# Patient Record
Sex: FEMALE | Race: WHITE | NOT HISPANIC OR LATINO | Employment: OTHER | URBAN - METROPOLITAN AREA
[De-identification: names, ages, dates, MRNs, and addresses within clinical notes are randomized per-mention and may not be internally consistent; named-entity substitution may affect disease eponyms.]

---

## 2023-08-22 ENCOUNTER — HOSPITAL ENCOUNTER (OUTPATIENT)
Dept: DATA CONVERSION | Facility: HOSPITAL | Age: 73
Discharge: HOME | End: 2023-08-22
Payer: MEDICARE

## 2023-08-22 DIAGNOSIS — R41.3 OTHER AMNESIA: ICD-10-CM

## 2023-08-22 LAB
ALBUMIN SERPL-MCNC: 4.2 GM/DL (ref 3.5–5)
ALBUMIN/GLOB SERPL: 1.4 RATIO (ref 1.5–3)
ALP BLD-CCNC: 70 U/L (ref 35–125)
ALT SERPL-CCNC: 14 U/L (ref 5–40)
AMMONIA PLAS-SCNC: 13 UMOL/L (ref 12–45)
ANION GAP SERPL CALCULATED.3IONS-SCNC: 13 MMOL/L (ref 0–19)
AST SERPL-CCNC: 20 U/L (ref 5–40)
BASOPHILS # BLD AUTO: 0.06 K/UL (ref 0–0.22)
BASOPHILS NFR BLD AUTO: 0.6 % (ref 0–1)
BILIRUB SERPL-MCNC: 0.5 MG/DL (ref 0.1–1.2)
BUN SERPL-MCNC: 21 MG/DL (ref 8–25)
BUN/CREAT SERPL: 19.1 RATIO (ref 8–21)
CALCIUM SERPL-MCNC: 10.2 MG/DL (ref 8.5–10.4)
CHLORIDE SERPL-SCNC: 106 MMOL/L (ref 97–107)
CO2 SERPL-SCNC: 26 MMOL/L (ref 24–31)
CREAT SERPL-MCNC: 1.1 MG/DL (ref 0.4–1.6)
DEPRECATED RDW RBC AUTO: 50.7 FL (ref 37–54)
DIFFERENTIAL METHOD BLD: ABNORMAL
EOSINOPHIL # BLD AUTO: 0.08 K/UL (ref 0–0.45)
EOSINOPHIL NFR BLD: 0.8 % (ref 0–3)
ERYTHROCYTE [DISTWIDTH] IN BLOOD BY AUTOMATED COUNT: 14.7 % (ref 11.7–15)
ERYTHROCYTE [SEDIMENTATION RATE] IN BLOOD BY WESTERGREN METHOD: 18 MM/HR (ref 0–30)
GFR SERPL CREATININE-BSD FRML MDRD: 54 ML/MIN/1.73 M2
GLOBULIN SER-MCNC: 3 G/DL (ref 1.9–3.7)
GLUCOSE SERPL-MCNC: 124 MG/DL (ref 65–99)
HCT VFR BLD AUTO: 40.4 % (ref 36–44)
HGB BLD-MCNC: 13.6 GM/DL (ref 12–15)
IMM GRANULOCYTES # BLD AUTO: 0.04 K/UL (ref 0–0.1)
LYMPHOCYTES # BLD AUTO: 1.29 K/UL (ref 1.2–3.2)
LYMPHOCYTES NFR BLD MANUAL: 13.1 % (ref 20–40)
MCH RBC QN AUTO: 31.3 PG (ref 26–34)
MCHC RBC AUTO-ENTMCNC: 33.7 % (ref 31–37)
MCV RBC AUTO: 93.1 FL (ref 80–100)
MONOCYTES # BLD AUTO: 0.46 K/UL (ref 0–0.8)
MONOCYTES NFR BLD MANUAL: 4.7 % (ref 0–8)
NEUTROPHILS # BLD AUTO: 7.94 K/UL
NEUTROPHILS # BLD AUTO: 7.94 K/UL (ref 1.8–7.7)
NEUTROPHILS.IMMATURE NFR BLD: 0.4 % (ref 0–1)
NEUTS SEG NFR BLD: 80.4 % (ref 50–70)
NRBC BLD-RTO: 0 /100 WBC
PLATELET # BLD AUTO: 251 K/UL (ref 150–450)
PMV BLD AUTO: 9.9 CU (ref 7–12.6)
POTASSIUM SERPL-SCNC: 4.5 MMOL/L (ref 3.4–5.1)
PROT SERPL-MCNC: 7.2 G/DL (ref 5.9–7.9)
RBC # BLD AUTO: 4.34 M/UL (ref 4–4.9)
SODIUM SERPL-SCNC: 145 MMOL/L (ref 133–145)
TSH SERPL DL<=0.05 MIU/L-ACNC: 0.83 MIU/L (ref 0.27–4.2)
VIT B12 SERPL-MCNC: 693 PG/ML (ref 211–946)
WBC # BLD AUTO: 9.9 K/UL (ref 4.5–11)

## 2023-08-25 LAB — RPR SER QL: NONREACTIVE

## 2023-09-27 PROBLEM — M19.90 ARTHRITIS: Status: ACTIVE | Noted: 2023-09-27

## 2023-09-27 PROBLEM — J44.9 CHRONIC OBSTRUCTIVE PULMONARY DISEASE (MULTI): Status: ACTIVE | Noted: 2023-09-27

## 2023-09-27 PROBLEM — R26.81 UNSTEADY GAIT: Status: ACTIVE | Noted: 2023-09-27

## 2023-09-27 PROBLEM — I65.23 BILATERAL CAROTID ARTERY STENOSIS: Status: ACTIVE | Noted: 2023-09-27

## 2023-09-27 PROBLEM — F41.9 ANXIETY: Status: ACTIVE | Noted: 2023-09-27

## 2023-09-27 PROBLEM — R73.01 IMPAIRED FASTING GLUCOSE: Status: ACTIVE | Noted: 2023-09-27

## 2023-09-27 PROBLEM — R32 INCONTINENCE: Status: ACTIVE | Noted: 2023-09-27

## 2023-09-27 PROBLEM — N20.0 KIDNEY STONES: Status: RESOLVED | Noted: 2023-09-27 | Resolved: 2023-09-27

## 2023-09-27 PROBLEM — N20.0 KIDNEY STONES: Status: ACTIVE | Noted: 2023-09-27

## 2023-09-27 PROBLEM — K59.00 CONSTIPATION: Status: RESOLVED | Noted: 2023-09-27 | Resolved: 2023-09-27

## 2023-09-27 PROBLEM — F32.A DEPRESSIVE DISORDER: Status: ACTIVE | Noted: 2023-09-27

## 2023-09-27 PROBLEM — R39.11 URINARY HESITANCY: Status: ACTIVE | Noted: 2023-09-27

## 2023-09-27 PROBLEM — R20.2 TINGLING OF RIGHT UPPER EXTREMITY: Status: ACTIVE | Noted: 2023-09-27

## 2023-09-27 PROBLEM — I35.8 AORTIC VALVE SCLEROSIS: Status: ACTIVE | Noted: 2023-09-27

## 2023-09-27 PROBLEM — R41.3 AMNESIA: Status: RESOLVED | Noted: 2023-09-27 | Resolved: 2023-09-27

## 2023-09-27 PROBLEM — M85.80 OSTEOPENIA, SENILE: Status: ACTIVE | Noted: 2023-09-27

## 2023-09-27 PROBLEM — E78.5 HYPERLIPIDEMIA: Status: ACTIVE | Noted: 2023-09-27

## 2023-09-27 PROBLEM — G45.8 SUBCLAVIAN STEAL SYNDROME OF LEFT SUBCLAVIAN ARTERY: Status: ACTIVE | Noted: 2023-09-27

## 2023-09-27 PROBLEM — H90.3 SENSORINEURAL HEARING LOSS OF BOTH EARS: Status: ACTIVE | Noted: 2023-09-27

## 2023-09-27 PROBLEM — F17.200 SMOKER: Status: ACTIVE | Noted: 2023-09-27

## 2023-09-27 PROBLEM — E55.9 VITAMIN D DEFICIENCY: Status: ACTIVE | Noted: 2023-09-27

## 2023-09-27 PROBLEM — E78.5 DYSLIPIDEMIA: Status: ACTIVE | Noted: 2023-09-27

## 2023-09-28 ENCOUNTER — OFFICE VISIT (OUTPATIENT)
Dept: PRIMARY CARE | Facility: CLINIC | Age: 73
End: 2023-09-28
Payer: MEDICARE

## 2023-09-28 VITALS
HEIGHT: 64 IN | SYSTOLIC BLOOD PRESSURE: 141 MMHG | DIASTOLIC BLOOD PRESSURE: 83 MMHG | WEIGHT: 187 LBS | BODY MASS INDEX: 31.92 KG/M2 | OXYGEN SATURATION: 94 % | HEART RATE: 78 BPM

## 2023-09-28 DIAGNOSIS — E55.9 VITAMIN D DEFICIENCY: ICD-10-CM

## 2023-09-28 DIAGNOSIS — J44.9 CHRONIC OBSTRUCTIVE PULMONARY DISEASE, UNSPECIFIED COPD TYPE (MULTI): ICD-10-CM

## 2023-09-28 DIAGNOSIS — E78.5 HYPERLIPIDEMIA, UNSPECIFIED HYPERLIPIDEMIA TYPE: ICD-10-CM

## 2023-09-28 DIAGNOSIS — F17.219 CIGARETTE NICOTINE DEPENDENCE WITH NICOTINE-INDUCED DISORDER: ICD-10-CM

## 2023-09-28 DIAGNOSIS — R39.11 URINARY HESITANCY: ICD-10-CM

## 2023-09-28 DIAGNOSIS — I67.9 SMALL VESSEL DISEASE, CEREBROVASCULAR: Primary | ICD-10-CM

## 2023-09-28 DIAGNOSIS — R51.9 CHRONIC INTRACTABLE HEADACHE, UNSPECIFIED HEADACHE TYPE: ICD-10-CM

## 2023-09-28 DIAGNOSIS — M19.90 ARTHRITIS: ICD-10-CM

## 2023-09-28 DIAGNOSIS — G44.329 CHRONIC POST-CONCUSSION HEADACHE: ICD-10-CM

## 2023-09-28 DIAGNOSIS — R73.01 IMPAIRED FASTING GLUCOSE: ICD-10-CM

## 2023-09-28 DIAGNOSIS — G89.29 CHRONIC INTRACTABLE HEADACHE, UNSPECIFIED HEADACHE TYPE: ICD-10-CM

## 2023-09-28 DIAGNOSIS — R26.81 UNSTEADY GAIT: ICD-10-CM

## 2023-09-28 DIAGNOSIS — Z12.31 ENCOUNTER FOR SCREENING MAMMOGRAM FOR MALIGNANT NEOPLASM OF BREAST: ICD-10-CM

## 2023-09-28 PROCEDURE — 1159F MED LIST DOCD IN RCRD: CPT | Performed by: INTERNAL MEDICINE

## 2023-09-28 PROCEDURE — 1160F RVW MEDS BY RX/DR IN RCRD: CPT | Performed by: INTERNAL MEDICINE

## 2023-09-28 PROCEDURE — 99204 OFFICE O/P NEW MOD 45 MIN: CPT | Performed by: INTERNAL MEDICINE

## 2023-09-28 PROCEDURE — 1125F AMNT PAIN NOTED PAIN PRSNT: CPT | Performed by: INTERNAL MEDICINE

## 2023-09-28 RX ORDER — TIZANIDINE 2 MG/1
TABLET ORAL
COMMUNITY
End: 2023-10-20 | Stop reason: SINTOL

## 2023-09-28 RX ORDER — SERTRALINE HYDROCHLORIDE 100 MG/1
TABLET, FILM COATED ORAL
COMMUNITY
Start: 2023-04-06

## 2023-09-28 RX ORDER — TIMOLOL MALEATE 5 MG/ML
1 SOLUTION/ DROPS OPHTHALMIC 2 TIMES DAILY
COMMUNITY
Start: 2022-11-18

## 2023-09-28 RX ORDER — IBUPROFEN 200 MG
1 TABLET ORAL EVERY 24 HOURS
Qty: 30 PATCH | Refills: 0 | Status: SHIPPED | OUTPATIENT
Start: 2023-09-28 | End: 2023-10-28

## 2023-09-28 RX ORDER — ASPIRIN 81 MG/1
TABLET ORAL EVERY 24 HOURS
COMMUNITY
Start: 2020-01-16

## 2023-09-28 RX ORDER — TRAZODONE HYDROCHLORIDE 50 MG/1
50 TABLET ORAL NIGHTLY PRN
Qty: 30 TABLET | Refills: 5 | Status: SHIPPED | OUTPATIENT
Start: 2023-09-28 | End: 2024-09-27

## 2023-09-28 RX ORDER — NABUMETONE 500 MG/1
TABLET, FILM COATED ORAL EVERY 12 HOURS
COMMUNITY
End: 2023-10-20 | Stop reason: SINTOL

## 2023-09-28 RX ORDER — ALBUTEROL SULFATE 90 UG/1
2 AEROSOL, METERED RESPIRATORY (INHALATION) EVERY 4 HOURS PRN
COMMUNITY

## 2023-09-28 RX ORDER — FLUTICASONE PROPIONATE AND SALMETEROL 250; 50 UG/1; UG/1
1 POWDER RESPIRATORY (INHALATION)
Qty: 60 EACH | Refills: 11 | Status: SHIPPED | OUTPATIENT
Start: 2023-09-28 | End: 2024-09-27

## 2023-09-28 ASSESSMENT — ENCOUNTER SYMPTOMS
COUGH: 1
HEADACHES: 1
CONSTITUTIONAL NEGATIVE: 1
TREMORS: 1
SLEEP DISTURBANCE: 1
CONFUSION: 1

## 2023-09-28 ASSESSMENT — PATIENT HEALTH QUESTIONNAIRE - PHQ9
2. FEELING DOWN, DEPRESSED OR HOPELESS: NOT AT ALL
1. LITTLE INTEREST OR PLEASURE IN DOING THINGS: NOT AT ALL
SUM OF ALL RESPONSES TO PHQ9 QUESTIONS 1 AND 2: 0

## 2023-09-28 ASSESSMENT — LIFESTYLE VARIABLES
SKIP TO QUESTIONS 9-10: 1
HOW OFTEN DO YOU HAVE SIX OR MORE DRINKS ON ONE OCCASION: NEVER
HOW OFTEN DO YOU HAVE A DRINK CONTAINING ALCOHOL: NEVER
AUDIT-C TOTAL SCORE: 0
HOW MANY STANDARD DRINKS CONTAINING ALCOHOL DO YOU HAVE ON A TYPICAL DAY: PATIENT DOES NOT DRINK

## 2023-09-28 ASSESSMENT — PAIN SCALES - GENERAL: PAINLEVEL: 5

## 2023-09-28 ASSESSMENT — COLUMBIA-SUICIDE SEVERITY RATING SCALE - C-SSRS: 1. IN THE PAST MONTH, HAVE YOU WISHED YOU WERE DEAD OR WISHED YOU COULD GO TO SLEEP AND NOT WAKE UP?: NO

## 2023-09-28 NOTE — PROGRESS NOTES
"Subjective   Patient ID: Hoda Gudino is a 73 y.o. female who presents for New Patient Visit.    HPI   Headache  for . Fell and hit head  in jan with  LOC   but unwitnessed.  Pain over  right temple  and  and forehead.  Wakes her in midddle of  night.    Mr brain no bleed svid  vol  loss 7/24/23.     She does not use  pain  relievers. No releif.    Once in  a while s he  falls a seep without the headache. No fever chills  weakness.  She  sleeps a lot  12  hours a night.   Lob since January.    Hx of  chronic  headaches.   Used  tylenol.  Would only get 1 -2 per month.   June dx  chronic  amnesia  by prev   provider.   Uti  Juarez  dxed      Denies  stress.   .   Sp  shoulder surgery  jan.     Pt has memory issues.  Some difficuty  answering question.     Review of Systems   Constitutional: Negative.    Respiratory:  Positive for cough.    Neurological:  Positive for tremors and headaches.   Psychiatric/Behavioral:  Positive for confusion and sleep disturbance.    All other systems reviewed and are negative.      Objective   /83 (BP Location: Right arm, Patient Position: Sitting, BP Cuff Size: Large adult)   Pulse 78   Ht 1.626 m (5' 4\")   Wt 84.8 kg (187 lb)   SpO2 94%   BMI 32.10 kg/m²     Physical Exam  Vitals and nursing note reviewed.   Constitutional:       Appearance: Normal appearance.   HENT:      Head: Normocephalic and atraumatic.      Nose: Nose normal.   Eyes:      Conjunctiva/sclera: Conjunctivae normal.   Cardiovascular:      Rate and Rhythm: Normal rate and regular rhythm.   Pulmonary:      Effort: Pulmonary effort is normal.   Skin:     General: Skin is dry.   Neurological:      General: No focal deficit present.      Mental Status: She is alert and oriented to person, place, and time. Mental status is at baseline.   Psychiatric:         Mood and Affect: Mood normal.         Behavior: Behavior normal.         Assessment/Plan   np with  undiagnosed problem chronic  uncertain   prognosis. "   Problem List Items Addressed This Visit             ICD-10-CM       Medium    Arthritis M19.90    Chronic obstructive pulmonary disease (CMS/HCC) J44.9    Relevant Medications    fluticasone propion-salmeteroL (Advair Diskus) 250-50 mcg/dose diskus inhaler    Hyperlipidemia E78.5    Impaired fasting glucose R73.01    Unsteady gait R26.81    Urinary hesitancy R39.11    Vitamin D deficiency E55.9     Other Visit Diagnoses         Codes    Small vessel disease, cerebrovascular    -  Primary I67.9    Encounter for screening mammogram for malignant neoplasm of breast     Z12.31    Relevant Orders    BI mammo bilateral screening tomosynthesis    Chronic intractable headache, unspecified headache type     R51.9, G89.29    Relevant Medications    traZODone (Desyrel) 50 mg tablet    Other Relevant Orders    C-Reactive Protein    Chronic post-concussion headache     G44.329    Relevant Medications    traZODone (Desyrel) 50 mg tablet    Cigarette nicotine dependence with nicotine-induced disorder     F17.219    Relevant Medications    nicotine (Nicoderm CQ) 21 mg/24 hr patch

## 2023-10-17 ENCOUNTER — HOSPITAL ENCOUNTER (EMERGENCY)
Facility: HOSPITAL | Age: 73
Discharge: HOME | End: 2023-10-17
Attending: STUDENT IN AN ORGANIZED HEALTH CARE EDUCATION/TRAINING PROGRAM
Payer: MEDICARE

## 2023-10-17 ENCOUNTER — APPOINTMENT (OUTPATIENT)
Dept: RADIOLOGY | Facility: HOSPITAL | Age: 73
End: 2023-10-17
Payer: MEDICARE

## 2023-10-17 VITALS
WEIGHT: 180 LBS | HEIGHT: 66 IN | OXYGEN SATURATION: 93 % | BODY MASS INDEX: 28.93 KG/M2 | HEART RATE: 81 BPM | DIASTOLIC BLOOD PRESSURE: 97 MMHG | TEMPERATURE: 97.7 F | RESPIRATION RATE: 19 BRPM | SYSTOLIC BLOOD PRESSURE: 231 MMHG

## 2023-10-17 DIAGNOSIS — E86.0 DEHYDRATION: ICD-10-CM

## 2023-10-17 DIAGNOSIS — S09.90XA HEAD INJURY, INITIAL ENCOUNTER: ICD-10-CM
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
## 2023-10-17 DIAGNOSIS — B37.9 CANDIDIASIS: ICD-10-CM

## 2023-10-17 DIAGNOSIS — M54.50 LOW BACK PAIN WITHOUT SCIATICA, UNSPECIFIED BACK PAIN LATERALITY, UNSPECIFIED CHRONICITY: ICD-10-CM

## 2023-10-17 LAB
ALBUMIN SERPL-MCNC: 4.3 G/DL (ref 3.5–5)
ALP BLD-CCNC: 93 U/L (ref 35–125)
ALT SERPL-CCNC: 13 U/L (ref 5–40)
ANION GAP SERPL CALC-SCNC: 12 MMOL/L
APPEARANCE UR: CLEAR
AST SERPL-CCNC: 24 U/L (ref 5–40)
BASOPHILS # BLD AUTO: 0.06 X10*3/UL (ref 0–0.1)
BASOPHILS NFR BLD AUTO: 0.5 %
BILIRUB SERPL-MCNC: 0.5 MG/DL (ref 0.1–1.2)
BILIRUB UR STRIP.AUTO-MCNC: NEGATIVE MG/DL
BUN SERPL-MCNC: 25 MG/DL (ref 8–25)
CALCIUM SERPL-MCNC: 10.3 MG/DL (ref 8.5–10.4)
CHLORIDE SERPL-SCNC: 101 MMOL/L (ref 97–107)
CO2 SERPL-SCNC: 27 MMOL/L (ref 24–31)
COLOR UR: YELLOW
CREAT SERPL-MCNC: 1.5 MG/DL (ref 0.4–1.6)
EOSINOPHIL # BLD AUTO: 0.01 X10*3/UL (ref 0–0.4)
EOSINOPHIL NFR BLD AUTO: 0.1 %
ERYTHROCYTE [DISTWIDTH] IN BLOOD BY AUTOMATED COUNT: 13.9 % (ref 11.5–14.5)
GFR SERPL CREATININE-BSD FRML MDRD: 37 ML/MIN/1.73M*2
GLUCOSE SERPL-MCNC: 135 MG/DL (ref 65–99)
GLUCOSE UR STRIP.AUTO-MCNC: NORMAL MG/DL
HCT VFR BLD AUTO: 42.6 % (ref 36–46)
HGB BLD-MCNC: 14.8 G/DL (ref 12–16)
HYALINE CASTS #/AREA URNS AUTO: ABNORMAL /LPF
IMM GRANULOCYTES # BLD AUTO: 0.02 X10*3/UL (ref 0–0.5)
IMM GRANULOCYTES NFR BLD AUTO: 0.2 % (ref 0–0.9)
KETONES UR STRIP.AUTO-MCNC: NEGATIVE MG/DL
LEUKOCYTE ESTERASE UR QL STRIP.AUTO: NEGATIVE
LYMPHOCYTES # BLD AUTO: 0.55 X10*3/UL (ref 0.8–3)
LYMPHOCYTES NFR BLD AUTO: 4.9 %
MCH RBC QN AUTO: 31.4 PG (ref 26–34)
MCHC RBC AUTO-ENTMCNC: 34.7 G/DL (ref 32–36)
MCV RBC AUTO: 90 FL (ref 80–100)
MONOCYTES # BLD AUTO: 0.88 X10*3/UL (ref 0.05–0.8)
MONOCYTES NFR BLD AUTO: 7.9 %
NEUTROPHILS # BLD AUTO: 9.62 X10*3/UL (ref 1.6–5.5)
NEUTROPHILS NFR BLD AUTO: 86.4 %
NITRITE UR QL STRIP.AUTO: NEGATIVE
NRBC BLD-RTO: 0 /100 WBCS (ref 0–0)
PH UR STRIP.AUTO: 6.5 [PH]
PLATELET # BLD AUTO: 304 X10*3/UL (ref 150–450)
PMV BLD AUTO: 9.9 FL (ref 7.5–11.5)
POTASSIUM SERPL-SCNC: 3.3 MMOL/L (ref 3.4–5.1)
PROT SERPL-MCNC: 8.1 G/DL (ref 5.9–7.9)
PROT UR STRIP.AUTO-MCNC: ABNORMAL MG/DL
RBC # BLD AUTO: 4.72 X10*6/UL (ref 4–5.2)
RBC # UR STRIP.AUTO: ABNORMAL /UL
RBC #/AREA URNS AUTO: >20 /HPF
SODIUM SERPL-SCNC: 140 MMOL/L (ref 133–145)
SP GR UR STRIP.AUTO: 1.02
UROBILINOGEN UR STRIP.AUTO-MCNC: NORMAL MG/DL
WBC # BLD AUTO: 11.1 X10*3/UL (ref 4.4–11.3)
WBC #/AREA URNS AUTO: ABNORMAL /HPF

## 2023-10-17 PROCEDURE — 2500000004 HC RX 250 GENERAL PHARMACY W/ HCPCS (ALT 636 FOR OP/ED): Performed by: PHYSICIAN ASSISTANT

## 2023-10-17 PROCEDURE — 71046 X-RAY EXAM CHEST 2 VIEWS: CPT

## 2023-10-17 PROCEDURE — 70450 CT HEAD/BRAIN W/O DYE: CPT | Mod: ME

## 2023-10-17 PROCEDURE — 87086 URINE CULTURE/COLONY COUNT: CPT | Mod: CMCLAB,WESLAB | Performed by: PHYSICIAN ASSISTANT

## 2023-10-17 PROCEDURE — 85025 COMPLETE CBC W/AUTO DIFF WBC: CPT | Performed by: PHYSICIAN ASSISTANT

## 2023-10-17 PROCEDURE — 36415 COLL VENOUS BLD VENIPUNCTURE: CPT | Performed by: PHYSICIAN ASSISTANT

## 2023-10-17 PROCEDURE — 72125 CT NECK SPINE W/O DYE: CPT | Mod: ME

## 2023-10-17 PROCEDURE — 81001 URINALYSIS AUTO W/SCOPE: CPT | Performed by: PHYSICIAN ASSISTANT

## 2023-10-17 PROCEDURE — 2500000001 HC RX 250 WO HCPCS SELF ADMINISTERED DRUGS (ALT 637 FOR MEDICARE OP): Performed by: STUDENT IN AN ORGANIZED HEALTH CARE EDUCATION/TRAINING PROGRAM

## 2023-10-17 PROCEDURE — 72220 X-RAY EXAM SACRUM TAILBONE: CPT | Mod: FY

## 2023-10-17 PROCEDURE — 99285 EMERGENCY DEPT VISIT HI MDM: CPT | Mod: 25

## 2023-10-17 PROCEDURE — 80053 COMPREHEN METABOLIC PANEL: CPT | Performed by: PHYSICIAN ASSISTANT

## 2023-10-17 RX ORDER — NYSTATIN 100000 [USP'U]/ML
500000 SUSPENSION ORAL 4 TIMES DAILY
Qty: 300 ML | Refills: 0 | Status: SHIPPED | OUTPATIENT
Start: 2023-10-17 | End: 2023-11-01

## 2023-10-17 RX ORDER — HYDRALAZINE HYDROCHLORIDE 25 MG/1
25 TABLET, FILM COATED ORAL ONCE
Status: COMPLETED | OUTPATIENT
Start: 2023-10-17 | End: 2023-10-17

## 2023-10-17 RX ORDER — NYSTATIN 100000 [USP'U]/G
1 POWDER TOPICAL 2 TIMES DAILY
Status: DISCONTINUED | OUTPATIENT
Start: 2023-10-17 | End: 2023-10-17 | Stop reason: HOSPADM

## 2023-10-17 RX ADMIN — SODIUM CHLORIDE 1000 ML: 900 INJECTION, SOLUTION INTRAVENOUS at 17:14

## 2023-10-17 RX ADMIN — SODIUM CHLORIDE 500 ML: 900 INJECTION, SOLUTION INTRAVENOUS at 13:45

## 2023-10-17 RX ADMIN — HYDRALAZINE HYDROCHLORIDE 25 MG: 25 TABLET, FILM COATED ORAL at 18:45

## 2023-10-17 ASSESSMENT — PAIN DESCRIPTION - DESCRIPTORS: DESCRIPTORS: ACHING;SORE

## 2023-10-17 ASSESSMENT — PAIN DESCRIPTION - LOCATION: LOCATION: COCCYX

## 2023-10-17 ASSESSMENT — PAIN DESCRIPTION - FREQUENCY: FREQUENCY: CONSTANT/CONTINUOUS

## 2023-10-17 ASSESSMENT — PAIN - FUNCTIONAL ASSESSMENT: PAIN_FUNCTIONAL_ASSESSMENT: 0-10

## 2023-10-17 ASSESSMENT — PAIN DESCRIPTION - PAIN TYPE: TYPE: ACUTE PAIN

## 2023-10-17 ASSESSMENT — PAIN SCALES - GENERAL: PAINLEVEL_OUTOF10: 10 - WORST POSSIBLE PAIN

## 2023-10-17 NOTE — ED PROVIDER NOTES
HPI   Chief Complaint   Patient presents with    Fall     Pt fell today from standing and fell on her tailbone. This happened three weeks ago as well and the pain is increased. Pt states she on a BT but doesn't know which one. EMS states she reported not hitting her head. Pt now stating she did hit her head. No LOC.       73-year-old female presenting emergency department from home with a chief complaint of mechanical fall.  States that she fell about 3 weeks ago as well.  She complains of mostly pain in her buttock.  She is unsure if she hit her head.  She is on aspirin.  She denies lightheadedness dizziness numbness weakness.  Denies chest pain or shortness of breath.  Denies fevers or chills.  Denies any other injury or trauma.                          Alana Coma Scale Score: 14                  Patient History   Past Medical History:   Diagnosis Date    Amnesia 09/27/2023    Kidney stones 09/27/2023    Personal history of malignant neoplasm, unspecified     History of malignant neoplasm    Personal history of other diseases of urinary system     History of kidney disease     Past Surgical History:   Procedure Laterality Date    OTHER SURGICAL HISTORY  10/20/2022    Kidney surgery    OTHER SURGICAL HISTORY  10/20/2022    Rotator cuff repair    OTHER SURGICAL HISTORY  10/20/2022    Dilation and curettage    OTHER SURGICAL HISTORY  10/20/2022    Wrist surgery    OTHER SURGICAL HISTORY  10/20/2022    Oral surgery    OTHER SURGICAL HISTORY  10/20/2022    Tonsillectomy with adenoidectomy     No family history on file.  Social History     Tobacco Use    Smoking status: Every Day     Types: Cigarettes    Smokeless tobacco: Never   Substance Use Topics    Alcohol use: Not Currently    Drug use: Never       Physical Exam   ED Triage Vitals [10/17/23 1222]   Temp Heart Rate Resp BP   37.1 °C (98.8 °F) 90 18 (!) 143/95      SpO2 Temp Source Heart Rate Source Patient Position   96 % Oral -- Sitting      BP Location FiO2  (%)     Right arm --       Physical Exam  Vitals and nursing note reviewed.   Constitutional:       Appearance: Normal appearance.   HENT:      Head: Normocephalic and atraumatic.      Mouth/Throat:      Mouth: Mucous membranes are dry.   Eyes:      Pupils: Pupils are equal, round, and reactive to light.   Cardiovascular:      Rate and Rhythm: Normal rate and regular rhythm.   Pulmonary:      Effort: Pulmonary effort is normal.      Breath sounds: Normal breath sounds.   Abdominal:      General: Abdomen is flat. Bowel sounds are normal.      Palpations: Abdomen is soft.   Musculoskeletal:         General: No swelling, deformity or signs of injury. Normal range of motion.      Cervical back: Normal range of motion.   Skin:     General: Skin is warm and dry.   Neurological:      General: No focal deficit present.      Mental Status: She is alert and oriented to person, place, and time.      Cranial Nerves: No cranial nerve deficit.      Sensory: No sensory deficit.      Motor: No weakness.   Psychiatric:         Mood and Affect: Mood normal.         Behavior: Behavior normal.         ED Course & MDM   Diagnoses as of 10/17/23 1757   Fall, initial encounter   Head injury, initial encounter   Low back pain without sciatica, unspecified back pain laterality, unspecified chronicity   Candidiasis   Dehydration       Medical Decision Making  Labs Reviewed  CBC WITH AUTO DIFFERENTIAL - Abnormal     WBC                           11.1                   nRBC                          0.0                    RBC                           4.72                   Hemoglobin                    14.8                   Hematocrit                    42.6                   MCV                           90                     MCH                           31.4                   MCHC                          34.7                   RDW                           13.9                   Platelets                     304                    MPV                            9.9                    Neutrophils %                 86.4                   Immature Granulocytes %, Automated   0.2                    Lymphocytes %                 4.9                    Monocytes %                   7.9                    Eosinophils %                 0.1                    Basophils %                   0.5                    Neutrophils Absolute          9.62 (*)               Immature Granulocytes Absolute, Au*   0.02                   Lymphocytes Absolute          0.55 (*)               Monocytes Absolute            0.88 (*)               Eosinophils Absolute          0.01                   Basophils Absolute            0.06                COMPREHENSIVE METABOLIC PANEL - Abnormal     Glucose                       135 (*)                Sodium                        140                    Potassium                     3.3 (*)                Chloride                      101                    Bicarbonate                   27                     Urea Nitrogen                 25                     Creatinine                    1.50                   eGFR                          37 (*)                 Calcium                       10.3                   Albumin                       4.3                    Alkaline Phosphatase          93                     Total Protein                 8.1 (*)                AST                           24                     Bilirubin, Total              0.5                    ALT                           13                     Anion Gap                     12                  URINALYSIS WITH REFLEX MICROSCOPIC AND CULTURE - Abnormal     Color, Urine                  Yellow                 Appearance, Urine             Clear                  Specific Gravity, Urine       1.018                  pH, Urine                     6.5                    Protein, Urine                300 (3+) (*)               Glucose, Urine                 Normal                 Blood, Urine                  1.0 (3+) (*)               Ketones, Urine                NEGATIVE                Bilirubin, Urine              NEGATIVE                Urobilinogen, Urine           Normal                 Nitrite, Urine                NEGATIVE                Leukocyte Esterase, Urine     NEGATIVE             URINALYSIS MICROSCOPIC WITH REFLEX CULTURE - Abnormal     WBC, Urine                    6-10 (*)               RBC, Urine                    >20 (*)                Hyaline Casts, Urine          2+ (*)              URINE CULTURE  URINALYSIS WITH REFLEX MICROSCOPIC AND CULTURE         Narrative: The following orders were created for panel order Urinalysis with Reflex Microscopic and Culture.                  Procedure                               Abnormality         Status                                     ---------                               -----------         ------                                     Urinalysis with Reflex M...[[id number]]  Abnormal            Final result                               Extra Urine Gray Tube[[id number]]                                                                                         Please view results for these tests on the individual orders.  EXTRA URINE GRAY TUBE  CT head wo IV contrast   Final Result    No acute intracranial findings.          MACRO:    None          Signed by: Vipin Boateng 10/17/2023 3:50 PM    Dictation workstation:   [id number]     CT cervical spine wo IV contrast   Final Result    1. No acute cervical spine findings.    2. Degenerative changes of the cervical spine causing mild left    foraminal stenosis at C3-C4, and mild right foraminal stenosis at    C5-C6 without spinal canal stenosis.          MACRO:    None          Signed by: Vipin Anyjonathon 10/17/2023 3:48 PM    Dictation workstation:   [id number]     XR chest 2 views   Final Result    No acute cardiopulmonary findings.                MACRO:    None           Signed by: Vipin Boateng 10/17/2023 3:17 PM    Dictation workstation:   [id number]     XR sacrum coccyx 2+ views   Final Result    No acute findings.                MACRO:    None          Signed by: Vipin Boateng 10/17/2023 3:15 PM    Dictation workstation:   [id number]     I have seen and evaluated this patient.  The attending physician has also seen and evaluated this patient.  Vital signs, laboratory testing and diagnostic images if applicable have been reviewed.  All laboratory and imaging is interpreted by myself unless otherwise stated.  Radiology studies are also formally interpreted by radiologist.    CBC without significant leukocytosis or anemia, metabolic panel without significant renal impairment or electrolyte abnormality.  Alysis noninfected.  Overall picture is consistent with dehydration.  Patient is much more awake and alert after IV hydration.  She feels comfortable with the plan of discharge.  Released in good condition to follow-up with her regular physician.  Her pulse ox was listed at 91 on room air however I personally checked and it is 95-96 on room air.        Procedure  Procedures     Emanuel Alvarenga PA-C  10/17/23 1745       Emanuel Alvarenga PA-C  10/17/23 1757

## 2023-10-17 NOTE — ED NOTES
I went into the patients room to obtain vitals that were due on this patient and I was getting very high blood pressure readings. I asked for my charge nurse Brook Keys to do a manual blood pressure, she was unable to hear the pulse and we went back to the monitor and received a reading of 231/97 . Attending DR. Hollingsworth was made aware, I myself, and Chuyita, and her nurse Tammy voiced our concern with her mental state of mind that she doesn't appear capable of safely going home alone if that is the plan. PA was asked by Charge nurse if he was sure he felt comfortable to DC patient. PA stated this has been an on going issue, and per patient family they had no concerns about her going home. Patient per my observations is still very confused and not steady on her feet and was found trying to get out the bad and could not tell me as to why she wanted to get out of the bed. I voiced my to concern nurse and my charge nurse.     Margaret Mendez, EMT  10/17/23 0974

## 2023-10-17 NOTE — ED NOTES
Patient lives at home alone and is extremely confused.  When asking patient when her last bowel movement was she stated 2 months ago.    When asking patient how did she arrive here at the hospital she stated she was visiting her step daughters and they made arrangements for her to come here.    Upon receiving the discharge papers for this patient, this nurse had some concerns along with the Tech.   High blood pressures, confusion and living alone.      This nurse spoke to PA and expressed concern.   PA stated patient just saw primary for same complaints.   Monika Molina MD.    This nurse called the patients emergency contact, Olinda Vasquez and expressed concern.  Olinda put this nurse on speaker phone so her brother Dwain could listen in.        This nurse reviewed the discharge papers with them.   In speaking with the family, this nurse made them aware of the patients personal hygiene.  They stated they are very well aware of it.  Patient has an odor.   Patient has a terrible rash on her buttocks and has a candida infection which nystatin power was ordered for.       Patient is scheduled to seed her Primary on November 2, 2023.      This nurse asked family if they are able to pick  patient up due to being discharged?   The brother Dwain stated he will be here shortly to pick patient up.      When this nurse asked if someone will be able to stay with the patient?   Dwain stated they are working on it.       Dwain RobynBanner  [phone].    Charge nurse was made aware.    Charge nurse went into patients room as well to take a manual blood pressure.   It was very difficult to hear she stated.    So charge nurse took pressure with large cuff through monitor 231/97.    Charge nurse made Dr. Hollingsworth aware.       Blood pressure pill was ordered and given by this nurse.         This nurse reviewed discharge paperwork with the family along with the patient             Tammy Singh LPN  10/17/23 1848       Tammy Lao  HECTOR Singh  10/17/23 1854       Tammy Singh, HECTOR  10/17/23 1859

## 2023-10-18 ENCOUNTER — APPOINTMENT (OUTPATIENT)
Dept: RADIOLOGY | Facility: HOSPITAL | Age: 73
End: 2023-10-18
Payer: MEDICARE

## 2023-10-18 ENCOUNTER — HOSPITAL ENCOUNTER (EMERGENCY)
Facility: HOSPITAL | Age: 73
Discharge: HOME | End: 2023-10-18
Attending: EMERGENCY MEDICINE
Payer: MEDICARE

## 2023-10-18 VITALS
DIASTOLIC BLOOD PRESSURE: 100 MMHG | TEMPERATURE: 98.2 F | BODY MASS INDEX: 28.93 KG/M2 | HEART RATE: 82 BPM | HEIGHT: 66 IN | WEIGHT: 180 LBS | RESPIRATION RATE: 18 BRPM | OXYGEN SATURATION: 99 % | SYSTOLIC BLOOD PRESSURE: 158 MMHG

## 2023-10-18 DIAGNOSIS — F03.A0 MILD DEMENTIA, UNSPECIFIED DEMENTIA TYPE, UNSPECIFIED WHETHER BEHAVIORAL, PSYCHOTIC, OR MOOD DISTURBANCE OR ANXIETY (MULTI): Primary | ICD-10-CM

## 2023-10-18 LAB
ALBUMIN SERPL-MCNC: 4 G/DL (ref 3.5–5)
ALP BLD-CCNC: 83 U/L (ref 35–125)
ALT SERPL-CCNC: 15 U/L (ref 5–40)
ANION GAP SERPL CALC-SCNC: 16 MMOL/L
APTT PPP: 21.8 SECONDS (ref 22–32.5)
AST SERPL-CCNC: 32 U/L (ref 5–40)
BASOPHILS # BLD AUTO: 0.05 X10*3/UL (ref 0–0.1)
BASOPHILS NFR BLD AUTO: 0.6 %
BILIRUB SERPL-MCNC: 0.3 MG/DL (ref 0.1–1.2)
BUN SERPL-MCNC: 30 MG/DL (ref 8–25)
CALCIUM SERPL-MCNC: 10.1 MG/DL (ref 8.5–10.4)
CHLORIDE SERPL-SCNC: 97 MMOL/L (ref 97–107)
CO2 SERPL-SCNC: 24 MMOL/L (ref 24–31)
CREAT SERPL-MCNC: 1.5 MG/DL (ref 0.4–1.6)
EOSINOPHIL # BLD AUTO: 0.03 X10*3/UL (ref 0–0.4)
EOSINOPHIL NFR BLD AUTO: 0.3 %
ERYTHROCYTE [DISTWIDTH] IN BLOOD BY AUTOMATED COUNT: 13.8 % (ref 11.5–14.5)
GFR SERPL CREATININE-BSD FRML MDRD: 37 ML/MIN/1.73M*2
GLUCOSE SERPL-MCNC: 112 MG/DL (ref 65–99)
HCT VFR BLD AUTO: 40.5 % (ref 36–46)
HGB BLD-MCNC: 14.1 G/DL (ref 12–16)
IMM GRANULOCYTES # BLD AUTO: 0.03 X10*3/UL (ref 0–0.5)
IMM GRANULOCYTES NFR BLD AUTO: 0.3 % (ref 0–0.9)
INR PPP: 1 (ref 0.9–1.2)
LYMPHOCYTES # BLD AUTO: 1.18 X10*3/UL (ref 0.8–3)
LYMPHOCYTES NFR BLD AUTO: 13.5 %
MCH RBC QN AUTO: 31.5 PG (ref 26–34)
MCHC RBC AUTO-ENTMCNC: 34.8 G/DL (ref 32–36)
MCV RBC AUTO: 90 FL (ref 80–100)
MONOCYTES # BLD AUTO: 0.73 X10*3/UL (ref 0.05–0.8)
MONOCYTES NFR BLD AUTO: 8.4 %
NEUTROPHILS # BLD AUTO: 6.69 X10*3/UL (ref 1.6–5.5)
NEUTROPHILS NFR BLD AUTO: 76.9 %
NRBC BLD-RTO: 0 /100 WBCS (ref 0–0)
PLATELET # BLD AUTO: 307 X10*3/UL (ref 150–450)
PMV BLD AUTO: 10 FL (ref 7.5–11.5)
POTASSIUM SERPL-SCNC: 3 MMOL/L (ref 3.4–5.1)
PROT SERPL-MCNC: 7.5 G/DL (ref 5.9–7.9)
PROTHROMBIN TIME: 10.8 SECONDS (ref 9.3–12.7)
RBC # BLD AUTO: 4.48 X10*6/UL (ref 4–5.2)
SODIUM SERPL-SCNC: 137 MMOL/L (ref 133–145)
WBC # BLD AUTO: 8.7 X10*3/UL (ref 4.4–11.3)

## 2023-10-18 PROCEDURE — 99284 EMERGENCY DEPT VISIT MOD MDM: CPT | Mod: 25 | Performed by: EMERGENCY MEDICINE

## 2023-10-18 PROCEDURE — 2500000004 HC RX 250 GENERAL PHARMACY W/ HCPCS (ALT 636 FOR OP/ED)

## 2023-10-18 PROCEDURE — 85730 THROMBOPLASTIN TIME PARTIAL: CPT | Performed by: EMERGENCY MEDICINE

## 2023-10-18 PROCEDURE — 71045 X-RAY EXAM CHEST 1 VIEW: CPT | Mod: FY

## 2023-10-18 PROCEDURE — 85610 PROTHROMBIN TIME: CPT | Performed by: EMERGENCY MEDICINE

## 2023-10-18 PROCEDURE — 85025 COMPLETE CBC W/AUTO DIFF WBC: CPT | Performed by: EMERGENCY MEDICINE

## 2023-10-18 PROCEDURE — 80053 COMPREHEN METABOLIC PANEL: CPT | Performed by: EMERGENCY MEDICINE

## 2023-10-18 PROCEDURE — 2500000004 HC RX 250 GENERAL PHARMACY W/ HCPCS (ALT 636 FOR OP/ED): Performed by: EMERGENCY MEDICINE

## 2023-10-18 PROCEDURE — 36415 COLL VENOUS BLD VENIPUNCTURE: CPT | Performed by: EMERGENCY MEDICINE

## 2023-10-18 RX ORDER — POTASSIUM CHLORIDE 20 MEQ/1
20 TABLET, EXTENDED RELEASE ORAL ONCE
Status: COMPLETED | OUTPATIENT
Start: 2023-10-18 | End: 2023-10-18

## 2023-10-18 RX ORDER — POTASSIUM CHLORIDE 20 MEQ/1
40 TABLET, EXTENDED RELEASE ORAL ONCE
Status: COMPLETED | OUTPATIENT
Start: 2023-10-18 | End: 2023-10-18

## 2023-10-18 RX ADMIN — POTASSIUM CHLORIDE 40 MEQ: 1500 TABLET, EXTENDED RELEASE ORAL at 19:28

## 2023-10-18 RX ADMIN — POTASSIUM CHLORIDE 20 MEQ: 1500 TABLET, FILM COATED, EXTENDED RELEASE ORAL at 18:55

## 2023-10-18 ASSESSMENT — PAIN SCALES - GENERAL
PAINLEVEL_OUTOF10: 5 - MODERATE PAIN
PAINLEVEL_OUTOF10: 5 - MODERATE PAIN

## 2023-10-18 ASSESSMENT — LIFESTYLE VARIABLES
REASON UNABLE TO ASSESS: NO
EVER FELT BAD OR GUILTY ABOUT YOUR DRINKING: NO
HAVE PEOPLE ANNOYED YOU BY CRITICIZING YOUR DRINKING: NO
EVER HAD A DRINK FIRST THING IN THE MORNING TO STEADY YOUR NERVES TO GET RID OF A HANGOVER: NO
HAVE YOU EVER FELT YOU SHOULD CUT DOWN ON YOUR DRINKING: NO

## 2023-10-18 ASSESSMENT — COLUMBIA-SUICIDE SEVERITY RATING SCALE - C-SSRS
1. IN THE PAST MONTH, HAVE YOU WISHED YOU WERE DEAD OR WISHED YOU COULD GO TO SLEEP AND NOT WAKE UP?: NO
2. HAVE YOU ACTUALLY HAD ANY THOUGHTS OF KILLING YOURSELF?: NO
6. HAVE YOU EVER DONE ANYTHING, STARTED TO DO ANYTHING, OR PREPARED TO DO ANYTHING TO END YOUR LIFE?: NO

## 2023-10-18 ASSESSMENT — PAIN - FUNCTIONAL ASSESSMENT: PAIN_FUNCTIONAL_ASSESSMENT: 0-10

## 2023-10-18 ASSESSMENT — PAIN DESCRIPTION - LOCATION: LOCATION: SACRUM

## 2023-10-18 NOTE — ED PROVIDER NOTES
HPI   Chief Complaint   Patient presents with   • Altered Mental Status     Pt and family report fall and change in mental status.   Multiple falls noted by family.  Tail bone pain is reported.          Patient is a 73-year-old female presenting to the emergency department for evaluation of altered mental status.  Patient is accompanied by her brother and sister-in-law.  They are currently staying with the patient.  Apparently patient has had multiple falls over the past few weeks.  She was seen yesterday in the emergency department due to a fall where she had pain in her tailbone.  She states she still has a lot of pain in her tailbone.  She was discharged and brought home by her brother.  Her brother states that he had a very hard time getting her into the house and into bed.  Her brother states that he was nervous to leave her alone and was concerned about her not being able to walk as well as she normally does.  Patient went to bed and brother said he woke up the next morning to her out of bed walking completely fine.  He was talking to the patient and she did not remember being at the hospital at all yesterday and mother reportedly states the patient was very confused therefore prompting her visit to the emergency department.  She denies any chest pain, shortness of breath, fever, chills, nausea, vomiting, abdominal pain, recent travel, recent illness.  She is complaining of some pain in her tailbone.                          Alana Coma Scale Score: 14                  Patient History   Past Medical History:   Diagnosis Date   • Amnesia 09/27/2023   • Kidney stones 09/27/2023   • Personal history of malignant neoplasm, unspecified     History of malignant neoplasm   • Personal history of other diseases of urinary system     History of kidney disease     Past Surgical History:   Procedure Laterality Date   • OTHER SURGICAL HISTORY  10/20/2022    Kidney surgery   • OTHER SURGICAL HISTORY  10/20/2022    Rotator  cuff repair   • OTHER SURGICAL HISTORY  10/20/2022    Dilation and curettage   • OTHER SURGICAL HISTORY  10/20/2022    Wrist surgery   • OTHER SURGICAL HISTORY  10/20/2022    Oral surgery   • OTHER SURGICAL HISTORY  10/20/2022    Tonsillectomy with adenoidectomy     No family history on file.  Social History     Tobacco Use   • Smoking status: Every Day     Types: Cigarettes   • Smokeless tobacco: Never   Substance Use Topics   • Alcohol use: Not Currently   • Drug use: Never       Physical Exam   ED Triage Vitals [10/18/23 1535]   Temp Heart Rate Resp BP   36.4 °C (97.5 °F) 81 16 (!) 172/128      SpO2 Temp Source Heart Rate Source Patient Position   100 % Temporal Monitor Sitting      BP Location FiO2 (%)     Right arm --       Physical Exam  Constitutional:       Appearance: She is well-developed.   HENT:      Head: Normocephalic and atraumatic.   Eyes:      Extraocular Movements: Extraocular movements intact.      Pupils: Pupils are equal, round, and reactive to light.   Cardiovascular:      Rate and Rhythm: Normal rate and regular rhythm.      Heart sounds: Normal heart sounds. No murmur heard.     No friction rub. No gallop.   Pulmonary:      Effort: Pulmonary effort is normal.      Breath sounds: Normal breath sounds.   Abdominal:      General: Bowel sounds are normal.      Palpations: Abdomen is soft.   Musculoskeletal:         General: Normal range of motion.      Cervical back: Normal range of motion and neck supple.   Skin:     General: Skin is warm and dry.   Neurological:      Mental Status: She is alert and oriented to person, place, and time. Mental status is at baseline.      GCS: GCS eye subscore is 4. GCS verbal subscore is 4. GCS motor subscore is 6.   Psychiatric:         Mood and Affect: Mood normal.         Speech: Speech normal.         Behavior: Behavior normal.         ED Course & MDM   ED Course as of 10/18/23 1936   Wed Oct 18, 2023   1802   EKG on my independent review  Read at  1540:   Sinus rhythm at 78 bpm, left axis deviation,  Normal intervals, voltage criteria for LVH, nonspecific  Repolarization abnormality and ST/T wave abnormality [LB]      ED Course User Index  [LB] Anusha Mayo MD         Diagnoses as of 10/18/23 1936   Mild dementia, unspecified dementia type, unspecified whether behavioral, psychotic, or mood disturbance or anxiety (CMS/[facility])       Medical Decision Making  Parts of this chart have been completed using voice recognition software. Please excuse any errors of transcription. Despite the medical decision making time stamp above-my medical decision making has taken place during the patient's entire visit. My thought process and reason for plan has been formulated from the time that I saw the patient until the time of disposition and is not specific to one specific moment during their visit and furthermore my MDM encompasses this entire chart and not only this text box.    Patient seen in conjunction with attending physician .     HPI: Detailed above.    Exam: A medically appropriate exam performed, outlined above, given the known history and presentation.    History obtained from: Patient    EKG: Reviewed by myself and attending physician.    Social Determinants of Health considered during this visit: Lives at home    Labs/Diagnostics:  Labs Reviewed   CBC WITH AUTO DIFFERENTIAL - Abnormal       Result Value    WBC 8.7      nRBC 0.0      RBC 4.48      Hemoglobin 14.1      Hematocrit 40.5      MCV 90      MCH 31.5      MCHC 34.8      RDW 13.8      Platelets 307      MPV 10.0      Neutrophils % 76.9      Immature Granulocytes %, Automated 0.3      Lymphocytes % 13.5      Monocytes % 8.4      Eosinophils % 0.3      Basophils % 0.6      Neutrophils Absolute 6.69 (*)     Immature Granulocytes Absolute, Automated 0.03      Lymphocytes Absolute 1.18      Monocytes Absolute 0.73      Eosinophils Absolute 0.03      Basophils Absolute 0.05     COMPREHENSIVE METABOLIC  PANEL - Abnormal    Glucose 112 (*)     Sodium 137      Potassium 3.0 (*)     Chloride 97      Bicarbonate 24      Urea Nitrogen 30 (*)     Creatinine 1.50      eGFR 37 (*)     Calcium 10.1      Albumin 4.0      Alkaline Phosphatase 83      Total Protein 7.5      AST 32      Bilirubin, Total 0.3      ALT 15      Anion Gap 16     APTT - Abnormal    aPTT 21.8 (*)    PROTIME-INR - Normal    Protime 10.8      INR 1.0      Narrative:     INR Therapeutic Range: 2.0-3.5   URINALYSIS WITH REFLEX MICROSCOPIC AND CULTURE    Narrative:     The following orders were created for panel order Urinalysis with Reflex Microscopic and Culture.  Procedure                               Abnormality         Status                     ---------                               -----------         ------                     Urinalysis with Reflex M...[[id number]]                                                 Extra Urine Gray Tube[[id number]]                                                         Please view results for these tests on the individual orders.   URINALYSIS WITH REFLEX MICROSCOPIC AND CULTURE   EXTRA URINE GRAY TUBE     XR chest 1 view   Final Result   No acute abnormalities.             Signed by: Venkat Graf 10/18/2023 4:54 PM   Dictation workstation:   [id number]        Medications given during visit: Potassium for hypokalemia.     Considerations/further MDM:  73-year-old female presenting to the emergency department for evaluation of altered mental status.  On physical exam vital signs are remarkable for hypertension otherwise stable and patient is in no acute distress.  She is complaining of some tailbone pain from her previous fall however imaging was done yesterday and was unremarkable.  GCS was 14 as patient is slightly confused and unable to tell me the month or year. Diagnostic labs and CXR ordered. CXR shows no acute cardiopulmonary process. Hypokalemia noted on CMP therefore supplementation ordered. CBC  unremarkable. Work-up yesterday was also reviewed and relatively unremarkable.  Patient patient was performed and patient and family feel okay being discharged and following up with PCP on this Friday.  They will then discuss visiting [facility] or another home health care team coming in to help with ADL care as well as rehab.  Upon further discussion with the patient's family was also noted that she has a history of chronic memory issues and therefore could possibly have an element of dementia.  Patient was discharged in stable condition.  Patient will return to the emergency department any new or worsening symptoms.    Procedure  Procedures     Anabela Mosqueda PA-C  10/19/23 0029

## 2023-10-19 ENCOUNTER — HOSPITAL ENCOUNTER (OUTPATIENT)
Dept: CARDIOLOGY | Facility: HOSPITAL | Age: 73
Discharge: HOME | End: 2023-10-19
Payer: MEDICARE

## 2023-10-19 LAB
ATRIAL RATE: 78 BPM
BACTERIA UR CULT: NO GROWTH
P AXIS: 31 DEGREES
P OFFSET: 181 MS
P ONSET: 141 MS
PR INTERVAL: 164 MS
Q ONSET: 223 MS
QRS COUNT: 13 BEATS
QRS DURATION: 94 MS
QT INTERVAL: 374 MS
QTC CALCULATION(BAZETT): 426 MS
QTC FREDERICIA: 408 MS
R AXIS: -42 DEGREES
T AXIS: 174 DEGREES
T OFFSET: 410 MS
VENTRICULAR RATE: 78 BPM

## 2023-10-19 PROCEDURE — 93005 ELECTROCARDIOGRAM TRACING: CPT

## 2023-10-20 ENCOUNTER — OFFICE VISIT (OUTPATIENT)
Dept: PRIMARY CARE | Facility: CLINIC | Age: 73
End: 2023-10-20
Payer: MEDICARE

## 2023-10-20 ENCOUNTER — LAB (OUTPATIENT)
Dept: LAB | Facility: LAB | Age: 73
End: 2023-10-20
Payer: MEDICARE

## 2023-10-20 VITALS
DIASTOLIC BLOOD PRESSURE: 74 MMHG | HEART RATE: 87 BPM | BODY MASS INDEX: 28.93 KG/M2 | HEIGHT: 66 IN | WEIGHT: 180 LBS | SYSTOLIC BLOOD PRESSURE: 185 MMHG | OXYGEN SATURATION: 96 %

## 2023-10-20 DIAGNOSIS — I67.9 SMALL VESSEL DISEASE, CEREBROVASCULAR: ICD-10-CM

## 2023-10-20 DIAGNOSIS — R26.89 BALANCE DISORDER: ICD-10-CM

## 2023-10-20 DIAGNOSIS — Z79.899 MEDICATION MANAGEMENT: ICD-10-CM

## 2023-10-20 DIAGNOSIS — M53.3 ACUTE COCCYGEAL PAIN: ICD-10-CM

## 2023-10-20 DIAGNOSIS — R29.6 FALLING: ICD-10-CM

## 2023-10-20 DIAGNOSIS — I10 UNCONTROLLED HYPERTENSION: Chronic | ICD-10-CM

## 2023-10-20 DIAGNOSIS — R26.81 UNSTEADY GAIT: ICD-10-CM

## 2023-10-20 DIAGNOSIS — R41.3 MEMORY LOSS, SHORT TERM: Primary | ICD-10-CM

## 2023-10-20 DIAGNOSIS — S39.92XA COCCYGEAL INJURY, INITIAL ENCOUNTER: ICD-10-CM

## 2023-10-20 DIAGNOSIS — N18.30 STAGE 3 CHRONIC KIDNEY DISEASE, UNSPECIFIED WHETHER STAGE 3A OR 3B CKD (MULTI): ICD-10-CM

## 2023-10-20 PROCEDURE — G2212 PROLONG OUTPT/OFFICE VIS: HCPCS | Performed by: INTERNAL MEDICINE

## 2023-10-20 PROCEDURE — 1159F MED LIST DOCD IN RCRD: CPT | Performed by: INTERNAL MEDICINE

## 2023-10-20 PROCEDURE — 87186 SC STD MICRODIL/AGAR DIL: CPT

## 2023-10-20 PROCEDURE — 1160F RVW MEDS BY RX/DR IN RCRD: CPT | Performed by: INTERNAL MEDICINE

## 2023-10-20 PROCEDURE — 3077F SYST BP >= 140 MM HG: CPT | Performed by: INTERNAL MEDICINE

## 2023-10-20 PROCEDURE — 87086 URINE CULTURE/COLONY COUNT: CPT

## 2023-10-20 PROCEDURE — 3078F DIAST BP <80 MM HG: CPT | Performed by: INTERNAL MEDICINE

## 2023-10-20 PROCEDURE — 1125F AMNT PAIN NOTED PAIN PRSNT: CPT | Performed by: INTERNAL MEDICINE

## 2023-10-20 PROCEDURE — 99215 OFFICE O/P EST HI 40 MIN: CPT | Performed by: INTERNAL MEDICINE

## 2023-10-20 RX ORDER — EZETIMIBE 10 MG/1
10 TABLET ORAL DAILY
COMMUNITY

## 2023-10-20 RX ORDER — ACETAMINOPHEN 500 MG
1000 TABLET ORAL EVERY 6 HOURS PRN
Qty: 120 TABLET | Refills: 0 | Status: SHIPPED | OUTPATIENT
Start: 2023-10-20 | End: 2023-10-30

## 2023-10-20 RX ORDER — AMLODIPINE BESYLATE 5 MG/1
5 TABLET ORAL DAILY
Qty: 30 TABLET | Refills: 5 | Status: SHIPPED | OUTPATIENT
Start: 2023-10-20 | End: 2024-04-17

## 2023-10-20 RX ORDER — BUPROPION HYDROCHLORIDE 150 MG/1
150 TABLET, FILM COATED, EXTENDED RELEASE ORAL DAILY
COMMUNITY
End: 2023-10-23 | Stop reason: ALTCHOICE

## 2023-10-20 RX ORDER — VIT C/E/ZN/COPPR/LUTEIN/ZEAXAN 250MG-90MG
25 CAPSULE ORAL DAILY
Qty: 30 CAPSULE | Refills: 11 | Status: SHIPPED | OUTPATIENT
Start: 2023-10-20 | End: 2024-10-19

## 2023-10-20 RX ORDER — DONEPEZIL HYDROCHLORIDE 10 MG/1
10 TABLET, ORALLY DISINTEGRATING ORAL NIGHTLY
COMMUNITY

## 2023-10-20 RX ORDER — OXYCODONE HYDROCHLORIDE 5 MG/1
5 TABLET ORAL 2 TIMES DAILY PRN
Qty: 30 TABLET | Refills: 0 | Status: SHIPPED | OUTPATIENT
Start: 2023-10-20 | End: 2023-11-04

## 2023-10-20 ASSESSMENT — ENCOUNTER SYMPTOMS
DEPRESSION: 0
CONSTITUTIONAL NEGATIVE: 1

## 2023-10-20 ASSESSMENT — COLUMBIA-SUICIDE SEVERITY RATING SCALE - C-SSRS
2. HAVE YOU ACTUALLY HAD ANY THOUGHTS OF KILLING YOURSELF?: NO
1. IN THE PAST MONTH, HAVE YOU WISHED YOU WERE DEAD OR WISHED YOU COULD GO TO SLEEP AND NOT WAKE UP?: NO
6. HAVE YOU EVER DONE ANYTHING, STARTED TO DO ANYTHING, OR PREPARED TO DO ANYTHING TO END YOUR LIFE?: NO

## 2023-10-20 ASSESSMENT — PATIENT HEALTH QUESTIONNAIRE - PHQ9
1. LITTLE INTEREST OR PLEASURE IN DOING THINGS: NOT AT ALL
SUM OF ALL RESPONSES TO PHQ9 QUESTIONS 1 AND 2: 0
2. FEELING DOWN, DEPRESSED OR HOPELESS: NOT AT ALL

## 2023-10-20 NOTE — PROGRESS NOTES
"Subjective   Patient ID: Hoda Gudino is a 73 y.o. female who presents for Follow-up (ER FUV, pt fell 7x since August. Pt also experiencing memory issues x 6 months.).  HPI  Frequent falls  memory issues.   Here with brother.   who  gives history.  He lives out of town   Unable to  remember recent events in the hospital, doctors names and treatments,    Went to  ed.   Ct  no  ich.  Microvascular  disease.   Sees  dr mahmood.    Tx  dr vasquez with  aricept and    namenda.   Brother says  namenda not in  pill box.   Bp uncontrolled.  HIGH IN ED.  GIVEN  RX FOR POTASSIUM  KCLOR CON.       Disch  10 / 17   SAYS SHE HAD PT FOR  2 MO  RECENTLY BUT CAN'T  REMEMBER ANY EXERCISES or  how long.     MEMORY  TEST.  BIDEN. ,   UNKNOWN.   Possible confabulation  about her memory.       Pt was not using  walker  in the home.   Was living alone.     C/o Tailbone pain since fall.  No help with tizaidine. Slept ok last night per  brother  on  trazodone.   Co  unable to sit or  walk.     Continues to smoke. Bupropion not  help.   Not  using nicotine patch.     Review of Systems   Constitutional: Negative.    All other systems reviewed and are negative.      Objective   BP Readings from Last 3 Encounters:   10/20/23 (!) 185/74   10/18/23 (!) 158/100   10/17/23 (!) 231/97      Wt Readings from Last 3 Encounters:   10/20/23 81.6 kg (180 lb)   10/18/23 81.6 kg (180 lb)   10/17/23 81.6 kg (180 lb)      BMI: Estimated body mass index is 29.05 kg/m² as calculated from the following:    Height as of this encounter: 1.676 m (5' 6\").    Weight as of this encounter: 81.6 kg (180 lb).    BSA: Estimated body surface area is 1.95 meters squared as calculated from the following:    Height as of this encounter: 1.676 m (5' 6\").    Weight as of this encounter: 81.6 kg (180 lb).  Physical Exam  Vitals and nursing note reviewed.   Constitutional:       Appearance: Normal appearance.   Eyes:      Conjunctiva/sclera: Conjunctivae normal. "   Cardiovascular:      Rate and Rhythm: Normal rate and regular rhythm.      Pulses: Normal pulses.      Heart sounds: Normal heart sounds.   Pulmonary:      Effort: Pulmonary effort is normal.   Skin:     General: Skin is dry.   Neurological:      General: No focal deficit present.      Mental Status: She is alert.   Psychiatric:         Mood and Affect: Mood normal.         Assessment/Plan   Problem List Items Addressed This Visit             ICD-10-CM       Medium    Stage 3 chronic kidney disease, unspecified whether stage 3a or 3b CKD (CMS/[facility]) N18.30    Relevant Orders    Referral to Home Care    Unsteady gait R26.81    Relevant Orders    Referral to Home Care     Other Visit Diagnoses         Codes    Memory loss, short term    -  Primary R41.3    Relevant Orders    Referral to Neurology    Referral to Home Care    Balance disorder     R26.89    Relevant Medications    cholecalciferol (Vitamin D3) 25 MCG (1000 UT) capsule    Other Relevant Orders    Referral to Neurology    Referral to Home Care    Referral to Physical Therapy    Referral to Social Work    Uncontrolled hypertension  (Chronic)    I10    start amlodipien.  order  skilled nursing  for med  titration.     Relevant Medications    amLODIPine (Norvasc) 5 mg tablet    Other Relevant Orders    Referral to Home Care    Small vessel disease, cerebrovascular     I67.9    resume  zetia and  controll bp.  bp not controlled.     Relevant Orders    Referral to Home Care    Acute coccygeal pain     M53.3    Relevant Medications    acetaminophen (Tylenol Extra Strength) 500 mg tablet    oxyCODONE (Roxicodone) 5 mg immediate release tablet    Other Relevant Orders    Urine Culture    Referral to Pain Medicine    Medication management     Z79.899    Relevant Medications    oxyCODONE (Roxicodone) 5 mg immediate release tablet    Other Relevant Orders    Urine Culture    Falling     R29.6    Relevant Medications    cholecalciferol (Vitamin D3) 25 MCG (1000 UT)  capsule    Other Relevant Orders    Referral to Physical Therapy    Referral to Social Work    Referral to Ophthalmology    Coccygeal injury, initial encounter     S39.92XA    co  severe pain.   no help with naproxen or tizanidine.   on  tyelnol    max.   a oxycoddone  5 mg  bid iwth caution.         Pt advised she needs to use   walker all the  time   CHART  REVIEWED AND  RECONCILED WITH OLD DATA / UPDATED IN NEW SYSTEM:  MEDS,  PROBLEMS,  ALLERGIES, SOC HISTORY.   Patient was identified as a fall risk. Risk prevention instructions provided.    Family advised she  needs  24 hour  supervision.   Her condition will not improve -  memory or  balance issues.   Sw  consulted.   Look for placement or  have her live with you.

## 2023-10-20 NOTE — PATIENT INSTRUCTIONS

## 2023-10-21 ENCOUNTER — HOME HEALTH ADMISSION (OUTPATIENT)
Dept: HOME HEALTH SERVICES | Facility: HOME HEALTH | Age: 73
End: 2023-10-21
Payer: MEDICARE

## 2023-10-23 DIAGNOSIS — N30.00 ACUTE CYSTITIS WITHOUT HEMATURIA: Primary | ICD-10-CM

## 2023-10-23 LAB — BACTERIA UR CULT: ABNORMAL

## 2023-10-23 RX ORDER — AMOXICILLIN 875 MG/1
875 TABLET, FILM COATED ORAL 2 TIMES DAILY
Qty: 20 TABLET | Refills: 0 | Status: SHIPPED | OUTPATIENT
Start: 2023-10-23 | End: 2023-11-02

## 2023-10-24 ENCOUNTER — HOME CARE VISIT (OUTPATIENT)
Dept: HOME HEALTH SERVICES | Facility: HOME HEALTH | Age: 73
End: 2023-10-24
Payer: MEDICARE

## 2023-10-24 VITALS — DIASTOLIC BLOOD PRESSURE: 90 MMHG | HEART RATE: 80 BPM | TEMPERATURE: 98.1 F | SYSTOLIC BLOOD PRESSURE: 170 MMHG

## 2023-10-24 PROCEDURE — 0023 HH SOC

## 2023-10-24 PROCEDURE — G0151 HHCP-SERV OF PT,EA 15 MIN: HCPCS

## 2023-10-24 ASSESSMENT — ACTIVITIES OF DAILY LIVING (ADL)
AMBULATION ASSISTANCE: STAND BY ASSIST
OASIS_M1830: 03
AMBULATION ASSISTANCE ON FLAT SURFACES: 1
ENTERING_EXITING_HOME: MINIMUM ASSIST
AMBULATION_DISTANCE/DURATION_TOLERATED: 60 FEET

## 2023-10-24 ASSESSMENT — ENCOUNTER SYMPTOMS
SUBJECTIVE PAIN PROGRESSION: UNCHANGED
MUSCLE WEAKNESS: 1
PERSON REPORTING PAIN: PATIENT
PAIN LOCATION - PAIN SEVERITY: 8/10
OCCASIONAL FEELINGS OF UNSTEADINESS: 1
HYPERTENSION: 1
LOWEST PAIN SEVERITY IN PAST 24 HOURS: 0/10
LOSS OF SENSATION IN FEET: 0
PAIN LOCATION: COCCYX
HIGHEST PAIN SEVERITY IN PAST 24 HOURS: 8/10
DEPRESSION: 1
PAIN: 1
PAIN SEVERITY GOAL: 0/10

## 2023-10-24 ASSESSMENT — PAIN SCALES - PAIN ASSESSMENT IN ADVANCED DEMENTIA (PAINAD)
CONSOLABILITY: 0 - NO NEED TO CONSOLE.
TOTALSCORE: 0
CONSOLABILITY: 0
BODYLANGUAGE: 0
BREATHING: 0
NEGVOCALIZATION: 0 - NONE.
BODYLANGUAGE: 0 - RELAXED.
NEGVOCALIZATION: 0
FACIALEXPRESSION: 0
FACIALEXPRESSION: 0 - SMILING OR INEXPRESSIVE.

## 2023-10-25 ENCOUNTER — HOME CARE VISIT (OUTPATIENT)
Dept: HOME HEALTH SERVICES | Facility: HOME HEALTH | Age: 73
End: 2023-10-25
Payer: MEDICARE

## 2023-10-25 ENCOUNTER — APPOINTMENT (OUTPATIENT)
Dept: HOME HEALTH SERVICES | Facility: HOME HEALTH | Age: 73
End: 2023-10-25
Payer: MEDICARE

## 2023-10-25 ASSESSMENT — ACTIVITIES OF DAILY LIVING (ADL)
HOME_HEALTH_OASIS: 01
OASIS_M1830: 01

## 2023-10-31 ENCOUNTER — HOME CARE VISIT (OUTPATIENT)
Dept: HOME HEALTH SERVICES | Facility: HOME HEALTH | Age: 73
End: 2023-10-31
Payer: MEDICARE

## 2023-11-02 ENCOUNTER — APPOINTMENT (OUTPATIENT)
Dept: PRIMARY CARE | Facility: CLINIC | Age: 73
End: 2023-11-02
Payer: MEDICARE

## 2023-11-09 PROCEDURE — G0180 MD CERTIFICATION HHA PATIENT: HCPCS | Performed by: INTERNAL MEDICINE

## 2023-12-22 ENCOUNTER — HOME CARE VISIT (OUTPATIENT)
Dept: HOME HEALTH SERVICES | Facility: HOME HEALTH | Age: 73
End: 2023-12-22
Payer: MEDICARE

## 2024-06-12 PROBLEM — D64.9 ANEMIA: Status: ACTIVE | Noted: 2024-06-12

## 2024-06-13 ENCOUNTER — APPOINTMENT (OUTPATIENT)
Dept: ULTRASOUND IMAGING | Age: 74
DRG: 811 | End: 2024-06-13
Attending: INTERNAL MEDICINE
Payer: MEDICARE

## 2024-06-13 ENCOUNTER — APPOINTMENT (OUTPATIENT)
Age: 74
DRG: 811 | End: 2024-06-13
Attending: INTERNAL MEDICINE
Payer: MEDICARE

## 2024-06-13 ENCOUNTER — APPOINTMENT (OUTPATIENT)
Dept: GENERAL RADIOLOGY | Age: 74
DRG: 811 | End: 2024-06-13
Attending: INTERNAL MEDICINE
Payer: MEDICARE

## 2024-06-13 ENCOUNTER — HOSPITAL ENCOUNTER (INPATIENT)
Age: 74
LOS: 11 days | Discharge: NURSING, REHAB OR LONG-TERM CARE | DRG: 811 | End: 2024-06-24
Attending: INTERNAL MEDICINE | Admitting: INTERNAL MEDICINE
Payer: MEDICARE

## 2024-06-13 ENCOUNTER — APPOINTMENT (OUTPATIENT)
Dept: CT IMAGING | Age: 74
DRG: 811 | End: 2024-06-13
Attending: INTERNAL MEDICINE
Payer: MEDICARE

## 2024-06-13 DIAGNOSIS — N18.32 STAGE 3B CHRONIC KIDNEY DISEASE (HCC): ICD-10-CM

## 2024-06-13 DIAGNOSIS — Z00.6 EXAMINATION FOR NORMAL COMPARISON FOR CLINICAL RESEARCH: ICD-10-CM

## 2024-06-13 DIAGNOSIS — I50.9 CHRONIC CONGESTIVE HEART FAILURE, UNSPECIFIED HEART FAILURE TYPE (HCC): Primary | ICD-10-CM

## 2024-06-13 PROBLEM — D50.0 NORMOCYTIC ANEMIA DUE TO BLOOD LOSS: Status: ACTIVE | Noted: 2024-06-12

## 2024-06-13 PROBLEM — T14.8XXA HEMATOMA: Status: ACTIVE | Noted: 2024-06-13

## 2024-06-13 LAB
ABO: NORMAL
ALBUMIN SERPL BCG-MCNC: 3.3 G/DL (ref 3.5–5.1)
ALP SERPL-CCNC: 41 U/L (ref 38–126)
ALT SERPL W/O P-5'-P-CCNC: 28 U/L (ref 11–66)
ANION GAP SERPL CALC-SCNC: 11 MEQ/L (ref 8–16)
ANTIBODY SCREEN: NORMAL
AST SERPL-CCNC: 29 U/L (ref 5–40)
BILIRUB SERPL-MCNC: 0.3 MG/DL (ref 0.3–1.2)
BUN SERPL-MCNC: 31 MG/DL (ref 7–22)
CALCIUM SERPL-MCNC: 8.8 MG/DL (ref 8.5–10.5)
CHLORIDE SERPL-SCNC: 104 MEQ/L (ref 98–111)
CO2 SERPL-SCNC: 22 MEQ/L (ref 23–33)
CREAT SERPL-MCNC: 2.9 MG/DL (ref 0.4–1.2)
DEPRECATED RDW RBC AUTO: 53.6 FL (ref 35–45)
EKG ATRIAL RATE: 81 BPM
EKG P AXIS: 36 DEGREES
EKG P-R INTERVAL: 184 MS
EKG Q-T INTERVAL: 430 MS
EKG QRS DURATION: 94 MS
EKG QTC CALCULATION (BAZETT): 499 MS
EKG R AXIS: -21 DEGREES
EKG T AXIS: 130 DEGREES
EKG VENTRICULAR RATE: 81 BPM
ERYTHROCYTE [DISTWIDTH] IN BLOOD BY AUTOMATED COUNT: 15.8 % (ref 11.5–14.5)
GFR SERPL CREATININE-BSD FRML MDRD: 17 ML/MIN/1.73M2
GLUCOSE SERPL-MCNC: 133 MG/DL (ref 70–108)
HCT VFR BLD AUTO: 19.3 % (ref 37–47)
HCT VFR BLD AUTO: 20.9 % (ref 37–47)
HCT VFR BLD AUTO: 21.6 % (ref 37–47)
HCT VFR BLD AUTO: 22.6 % (ref 37–47)
HGB BLD-MCNC: 6.3 GM/DL (ref 12–16)
HGB BLD-MCNC: 6.8 GM/DL (ref 12–16)
HGB BLD-MCNC: 7.1 GM/DL (ref 12–16)
HGB BLD-MCNC: 7.4 GM/DL (ref 12–16)
MCH RBC QN AUTO: 31 PG (ref 26–33)
MCHC RBC AUTO-ENTMCNC: 32.7 GM/DL (ref 32.2–35.5)
MCV RBC AUTO: 94.6 FL (ref 81–99)
PLATELET # BLD AUTO: 197 THOU/MM3 (ref 130–400)
PMV BLD AUTO: 10 FL (ref 9.4–12.4)
POTASSIUM SERPL-SCNC: 4 MEQ/L (ref 3.5–5.2)
PROT SERPL-MCNC: 5.4 G/DL (ref 6.1–8)
RBC # BLD AUTO: 2.39 MILL/MM3 (ref 4.2–5.4)
RH FACTOR: NORMAL
SODIUM SERPL-SCNC: 137 MEQ/L (ref 135–145)
WBC # BLD AUTO: 10.1 THOU/MM3 (ref 4.8–10.8)

## 2024-06-13 PROCEDURE — 36415 COLL VENOUS BLD VENIPUNCTURE: CPT

## 2024-06-13 PROCEDURE — 86850 RBC ANTIBODY SCREEN: CPT

## 2024-06-13 PROCEDURE — 93005 ELECTROCARDIOGRAM TRACING: CPT | Performed by: STUDENT IN AN ORGANIZED HEALTH CARE EDUCATION/TRAINING PROGRAM

## 2024-06-13 PROCEDURE — 85027 COMPLETE CBC AUTOMATED: CPT

## 2024-06-13 PROCEDURE — 93306 TTE W/DOPPLER COMPLETE: CPT

## 2024-06-13 PROCEDURE — 86900 BLOOD TYPING SEROLOGIC ABO: CPT

## 2024-06-13 PROCEDURE — 93306 TTE W/DOPPLER COMPLETE: CPT | Performed by: NUCLEAR MEDICINE

## 2024-06-13 PROCEDURE — 86923 COMPATIBILITY TEST ELECTRIC: CPT

## 2024-06-13 PROCEDURE — 76882 US LMTD JT/FCL EVL NVASC XTR: CPT

## 2024-06-13 PROCEDURE — 6360000002 HC RX W HCPCS

## 2024-06-13 PROCEDURE — 71045 X-RAY EXAM CHEST 1 VIEW: CPT

## 2024-06-13 PROCEDURE — 85014 HEMATOCRIT: CPT

## 2024-06-13 PROCEDURE — 30233N1 TRANSFUSION OF NONAUTOLOGOUS RED BLOOD CELLS INTO PERIPHERAL VEIN, PERCUTANEOUS APPROACH: ICD-10-PCS | Performed by: INTERNAL MEDICINE

## 2024-06-13 PROCEDURE — 2580000003 HC RX 258

## 2024-06-13 PROCEDURE — 2060000000 HC ICU INTERMEDIATE R&B

## 2024-06-13 PROCEDURE — P9016 RBC LEUKOCYTES REDUCED: HCPCS

## 2024-06-13 PROCEDURE — 76770 US EXAM ABDO BACK WALL COMP: CPT

## 2024-06-13 PROCEDURE — 93010 ELECTROCARDIOGRAM REPORT: CPT | Performed by: INTERNAL MEDICINE

## 2024-06-13 PROCEDURE — 86901 BLOOD TYPING SEROLOGIC RH(D): CPT

## 2024-06-13 PROCEDURE — 6370000000 HC RX 637 (ALT 250 FOR IP)

## 2024-06-13 PROCEDURE — 80053 COMPREHEN METABOLIC PANEL: CPT

## 2024-06-13 PROCEDURE — 36430 TRANSFUSION BLD/BLD COMPNT: CPT

## 2024-06-13 PROCEDURE — 85018 HEMOGLOBIN: CPT

## 2024-06-13 PROCEDURE — 99223 1ST HOSP IP/OBS HIGH 75: CPT | Performed by: INTERNAL MEDICINE

## 2024-06-13 RX ORDER — LANOLIN ALCOHOL/MO/W.PET/CERES
3 CREAM (GRAM) TOPICAL DAILY
COMMUNITY

## 2024-06-13 RX ORDER — SODIUM CHLORIDE 0.9 % (FLUSH) 0.9 %
5-40 SYRINGE (ML) INJECTION EVERY 12 HOURS SCHEDULED
Status: DISCONTINUED | OUTPATIENT
Start: 2024-06-13 | End: 2024-06-24 | Stop reason: HOSPADM

## 2024-06-13 RX ORDER — ISOSORBIDE MONONITRATE 30 MG/1
30 TABLET, EXTENDED RELEASE ORAL DAILY
Status: DISCONTINUED | OUTPATIENT
Start: 2024-06-13 | End: 2024-06-24 | Stop reason: HOSPADM

## 2024-06-13 RX ORDER — SERTRALINE HYDROCHLORIDE 100 MG/1
100 TABLET, FILM COATED ORAL DAILY
Status: DISCONTINUED | OUTPATIENT
Start: 2024-06-13 | End: 2024-06-24 | Stop reason: HOSPADM

## 2024-06-13 RX ORDER — TRAZODONE HYDROCHLORIDE 50 MG/1
50 TABLET ORAL NIGHTLY
Status: DISCONTINUED | OUTPATIENT
Start: 2024-06-13 | End: 2024-06-24 | Stop reason: HOSPADM

## 2024-06-13 RX ORDER — MAGNESIUM SULFATE IN WATER 40 MG/ML
2000 INJECTION, SOLUTION INTRAVENOUS PRN
Status: DISCONTINUED | OUTPATIENT
Start: 2024-06-13 | End: 2024-06-13 | Stop reason: CLARIF

## 2024-06-13 RX ORDER — CARVEDILOL 25 MG/1
25 TABLET ORAL 2 TIMES DAILY WITH MEALS
Status: ON HOLD | COMMUNITY
End: 2024-06-24 | Stop reason: HOSPADM

## 2024-06-13 RX ORDER — ATORVASTATIN CALCIUM 40 MG/1
40 TABLET, FILM COATED ORAL DAILY
COMMUNITY

## 2024-06-13 RX ORDER — GUAIFENESIN 400 MG/1
400 TABLET ORAL 2 TIMES DAILY PRN
COMMUNITY

## 2024-06-13 RX ORDER — ACETAMINOPHEN 650 MG/1
650 SUPPOSITORY RECTAL EVERY 6 HOURS PRN
Status: DISCONTINUED | OUTPATIENT
Start: 2024-06-13 | End: 2024-06-24 | Stop reason: HOSPADM

## 2024-06-13 RX ORDER — POTASSIUM CHLORIDE 7.45 MG/ML
10 INJECTION INTRAVENOUS 2 TIMES DAILY
COMMUNITY

## 2024-06-13 RX ORDER — GLUCAGON 1 MG/ML
1 KIT INJECTION PRN
Status: DISCONTINUED | OUTPATIENT
Start: 2024-06-13 | End: 2024-06-24 | Stop reason: HOSPADM

## 2024-06-13 RX ORDER — ONDANSETRON 4 MG/1
4 TABLET, ORALLY DISINTEGRATING ORAL EVERY 8 HOURS PRN
Status: DISCONTINUED | OUTPATIENT
Start: 2024-06-13 | End: 2024-06-24 | Stop reason: HOSPADM

## 2024-06-13 RX ORDER — BISACODYL 5 MG/1
5 TABLET, DELAYED RELEASE ORAL DAILY
COMMUNITY

## 2024-06-13 RX ORDER — SODIUM CHLORIDE 9 MG/ML
INJECTION, SOLUTION INTRAVENOUS PRN
Status: DISCONTINUED | OUTPATIENT
Start: 2024-06-13 | End: 2024-06-15

## 2024-06-13 RX ORDER — POLYETHYLENE GLYCOL 3350 17 G/17G
17 POWDER, FOR SOLUTION ORAL DAILY PRN
Status: DISCONTINUED | OUTPATIENT
Start: 2024-06-13 | End: 2024-06-24 | Stop reason: HOSPADM

## 2024-06-13 RX ORDER — POTASSIUM CHLORIDE 20 MEQ/1
40 TABLET, EXTENDED RELEASE ORAL PRN
Status: DISCONTINUED | OUTPATIENT
Start: 2024-06-13 | End: 2024-06-13 | Stop reason: CLARIF

## 2024-06-13 RX ORDER — TRAZODONE HYDROCHLORIDE 50 MG/1
50 TABLET ORAL NIGHTLY
COMMUNITY

## 2024-06-13 RX ORDER — BUMETANIDE 1 MG/1
2 TABLET ORAL EVERY OTHER DAY
Status: DISCONTINUED | OUTPATIENT
Start: 2024-06-13 | End: 2024-06-24 | Stop reason: HOSPADM

## 2024-06-13 RX ORDER — SULFAMETHOXAZOLE AND TRIMETHOPRIM 800; 160 MG/1; MG/1
1 TABLET ORAL 2 TIMES DAILY
Status: ON HOLD | COMMUNITY
End: 2024-06-24 | Stop reason: HOSPADM

## 2024-06-13 RX ORDER — ASPIRIN 81 MG/1
81 TABLET, CHEWABLE ORAL DAILY
Status: DISCONTINUED | OUTPATIENT
Start: 2024-06-13 | End: 2024-06-24 | Stop reason: HOSPADM

## 2024-06-13 RX ORDER — DEXTROSE MONOHYDRATE 100 MG/ML
INJECTION, SOLUTION INTRAVENOUS CONTINUOUS PRN
Status: DISCONTINUED | OUTPATIENT
Start: 2024-06-13 | End: 2024-06-24 | Stop reason: HOSPADM

## 2024-06-13 RX ORDER — CARVEDILOL 6.25 MG/1
12.5 TABLET ORAL 2 TIMES DAILY WITH MEALS
Status: DISCONTINUED | OUTPATIENT
Start: 2024-06-13 | End: 2024-06-24 | Stop reason: HOSPADM

## 2024-06-13 RX ORDER — M-VIT,TX,IRON,MINS/CALC/FOLIC 27MG-0.4MG
1 TABLET ORAL DAILY
COMMUNITY

## 2024-06-13 RX ORDER — EZETIMIBE 10 MG/1
10 TABLET ORAL NIGHTLY
Status: DISCONTINUED | OUTPATIENT
Start: 2024-06-13 | End: 2024-06-24 | Stop reason: HOSPADM

## 2024-06-13 RX ORDER — DOCUSATE SODIUM 100 MG/1
100 CAPSULE, LIQUID FILLED ORAL DAILY
COMMUNITY

## 2024-06-13 RX ORDER — PREDNISONE 5 MG/1
5 TABLET ORAL DAILY
COMMUNITY

## 2024-06-13 RX ORDER — ACETAMINOPHEN 160 MG
1 TABLET,DISINTEGRATING ORAL DAILY
COMMUNITY

## 2024-06-13 RX ORDER — EZETIMIBE 10 MG/1
10 TABLET ORAL DAILY
COMMUNITY

## 2024-06-13 RX ORDER — MEMANTINE HYDROCHLORIDE 10 MG/1
10 TABLET ORAL 2 TIMES DAILY
Status: DISCONTINUED | OUTPATIENT
Start: 2024-06-13 | End: 2024-06-15

## 2024-06-13 RX ORDER — ALBUTEROL SULFATE 90 UG/1
1 AEROSOL, METERED RESPIRATORY (INHALATION) EVERY 4 HOURS PRN
COMMUNITY

## 2024-06-13 RX ORDER — ISOSORBIDE MONONITRATE 30 MG/1
30 TABLET, EXTENDED RELEASE ORAL DAILY
COMMUNITY

## 2024-06-13 RX ORDER — HYDRALAZINE HYDROCHLORIDE 50 MG/1
50 TABLET, FILM COATED ORAL 3 TIMES DAILY
COMMUNITY

## 2024-06-13 RX ORDER — HYDRALAZINE HYDROCHLORIDE 20 MG/ML
5 INJECTION INTRAMUSCULAR; INTRAVENOUS EVERY 6 HOURS PRN
Status: DISCONTINUED | OUTPATIENT
Start: 2024-06-13 | End: 2024-06-17

## 2024-06-13 RX ORDER — VITAMIN B COMPLEX
1 CAPSULE ORAL DAILY
COMMUNITY

## 2024-06-13 RX ORDER — HYDRALAZINE HYDROCHLORIDE 25 MG/1
25 TABLET, FILM COATED ORAL EVERY 8 HOURS SCHEDULED
Status: DISCONTINUED | OUTPATIENT
Start: 2024-06-13 | End: 2024-06-17

## 2024-06-13 RX ORDER — SODIUM CHLORIDE 0.9 % (FLUSH) 0.9 %
5-40 SYRINGE (ML) INJECTION PRN
Status: DISCONTINUED | OUTPATIENT
Start: 2024-06-13 | End: 2024-06-24 | Stop reason: HOSPADM

## 2024-06-13 RX ORDER — ATORVASTATIN CALCIUM 40 MG/1
40 TABLET, FILM COATED ORAL NIGHTLY
Status: DISCONTINUED | OUTPATIENT
Start: 2024-06-13 | End: 2024-06-24 | Stop reason: HOSPADM

## 2024-06-13 RX ORDER — ASPIRIN 81 MG/1
81 TABLET, CHEWABLE ORAL DAILY
COMMUNITY

## 2024-06-13 RX ORDER — SODIUM CHLORIDE 9 MG/ML
INJECTION, SOLUTION INTRAVENOUS PRN
Status: DISCONTINUED | OUTPATIENT
Start: 2024-06-13 | End: 2024-06-24 | Stop reason: HOSPADM

## 2024-06-13 RX ORDER — SERTRALINE HYDROCHLORIDE 100 MG/1
100 TABLET, FILM COATED ORAL DAILY
COMMUNITY

## 2024-06-13 RX ORDER — POTASSIUM CHLORIDE 7.45 MG/ML
10 INJECTION INTRAVENOUS PRN
Status: DISCONTINUED | OUTPATIENT
Start: 2024-06-13 | End: 2024-06-13 | Stop reason: CLARIF

## 2024-06-13 RX ORDER — ONDANSETRON 2 MG/ML
4 INJECTION INTRAMUSCULAR; INTRAVENOUS EVERY 6 HOURS PRN
Status: DISCONTINUED | OUTPATIENT
Start: 2024-06-13 | End: 2024-06-24 | Stop reason: HOSPADM

## 2024-06-13 RX ORDER — ALBUTEROL SULFATE 90 UG/1
2 AEROSOL, METERED RESPIRATORY (INHALATION) EVERY 6 HOURS PRN
Status: DISCONTINUED | OUTPATIENT
Start: 2024-06-13 | End: 2024-06-15

## 2024-06-13 RX ORDER — ACETAMINOPHEN 325 MG/1
650 TABLET ORAL EVERY 6 HOURS PRN
Status: DISCONTINUED | OUTPATIENT
Start: 2024-06-13 | End: 2024-06-24 | Stop reason: HOSPADM

## 2024-06-13 RX ORDER — MEMANTINE HYDROCHLORIDE 10 MG/1
10 TABLET ORAL 2 TIMES DAILY
Status: ON HOLD | COMMUNITY
End: 2024-06-24 | Stop reason: HOSPADM

## 2024-06-13 RX ORDER — BUMETANIDE 1 MG/1
2 TABLET ORAL DAILY
Status: ON HOLD | COMMUNITY
End: 2024-06-24 | Stop reason: HOSPADM

## 2024-06-13 RX ADMIN — ACETAMINOPHEN 650 MG: 325 TABLET ORAL at 10:04

## 2024-06-13 RX ADMIN — EZETIMIBE 10 MG: 10 TABLET ORAL at 20:09

## 2024-06-13 RX ADMIN — MEMANTINE HYDROCHLORIDE 10 MG: 10 TABLET ORAL at 20:09

## 2024-06-13 RX ADMIN — TRAZODONE HYDROCHLORIDE 50 MG: 50 TABLET ORAL at 20:09

## 2024-06-13 RX ADMIN — SERTRALINE 100 MG: 100 TABLET, FILM COATED ORAL at 13:22

## 2024-06-13 RX ADMIN — SODIUM CHLORIDE, PRESERVATIVE FREE 10 ML: 5 INJECTION INTRAVENOUS at 08:43

## 2024-06-13 RX ADMIN — HYDRALAZINE HYDROCHLORIDE 25 MG: 25 TABLET ORAL at 22:34

## 2024-06-13 RX ADMIN — SODIUM CHLORIDE, PRESERVATIVE FREE 10 ML: 5 INJECTION INTRAVENOUS at 20:09

## 2024-06-13 RX ADMIN — HYDRALAZINE HYDROCHLORIDE 5 MG: 20 INJECTION INTRAMUSCULAR; INTRAVENOUS at 17:46

## 2024-06-13 RX ADMIN — MEMANTINE HYDROCHLORIDE 10 MG: 10 TABLET ORAL at 13:22

## 2024-06-13 RX ADMIN — ATORVASTATIN CALCIUM 40 MG: 40 TABLET, FILM COATED ORAL at 20:09

## 2024-06-13 RX ADMIN — ONDANSETRON 4 MG: 2 INJECTION INTRAMUSCULAR; INTRAVENOUS at 15:15

## 2024-06-13 ASSESSMENT — PAIN SCALES - GENERAL
PAINLEVEL_OUTOF10: 6
PAINLEVEL_OUTOF10: 0
PAINLEVEL_OUTOF10: 3
PAINLEVEL_OUTOF10: 0
PAINLEVEL_OUTOF10: 0

## 2024-06-13 ASSESSMENT — PAIN DESCRIPTION - LOCATION
LOCATION: HIP
LOCATION: HEAD

## 2024-06-13 NOTE — CARE COORDINATION
6/13/24, 11:09 AM EDT    DISCHARGE PLANNING EVALUATION    This SW was informed by JOSELYN Ford that patient has a telesitter in room due to confusion and fall risk. SW attempted call to brother, left a voicemail. RN states that family is supposed to be on their way up now, SW will stop back in room a little later to speak with them in person.

## 2024-06-13 NOTE — H&P
History & Physical  Internal Medicine Resident         Patient: Berenice Fitzgerald 73 y.o. female      : 1950  Date of Admission: 2024  Date of Service: Pt seen/examined on 24 and Admitted to Inpatient with expected LOS greater than two midnights due to medical therapy.       ASSESSMENT AND PLAN  Acute normocytic anemia: Likely secondary to blood loss.  Patient has multiple falls since  with most recent being on .  Concerns for possible hip dislocation at SNF and patient was brought to [facility].  Hemoglobin at outside facility was 6.7-5.9.  Patient received 1 unit PRBC.  CT A/P on  from outside facility showed subcutaneous edema from bilateral hips with suspicion for subcutaneous hematoma on right side.  Hemoglobin on arrival 7.4  Monitor H&H every 6 hours  Transfuse PRBC for hemoglobin less than 7 or hemodynamically unstable  Recurrent falls: Multiple falls as discussed above.  Patient notes some blackening of vision upon standing up however she denies any other symptoms.  Patient denies any syncopal events, however patient's brother states there are times she is discovered on the floor and states that \"she just wants to sleep there\".  Patient may not be the most reliable historian.  Fall precautions  Attempt to obtain outside facilities imaging.  CT A/P on  showed no acute fractures.  Subcutaneous edema in bilateral hips with suspicion for subcutaneous hematoma on right side.  CXR on  showed borderline cardiomegaly.  Recent UTI: Per patient's brother, she is recent UTI that is currently undergoing antibiotic treatment.  UA and urine culture ordered  Consider starting antibiotics  CHF: Per patient's brother, patient has a history of CHF.  No echo on record.  Home medication: Coreg  Echo ordered  Holding Coreg with concerns for blood pressure in setting of suspected active bleeding.  Resume as tolerated  CHRISTOPHER on CKD stage IV: Creatinine on arrival 2.9.  No baseline

## 2024-06-13 NOTE — CARE COORDINATION
Case Management Assessment Initial Evaluation    Date/Time of Evaluation: 2024 7:14 AM  Assessment Completed by: Liliana Glover RN    If patient is discharged prior to next notation, then this note serves as note for discharge by case management.    Patient Name: Berenice Fitzgerald                   YOB: 1950  Diagnosis: Anemia [D64.9]                   Date / Time: 2024  2:27 AM  [facility]     Patient Admission Status: Inpatient   Readmission Risk Low 0-14, Mod 15-19), High > 20: Readmission Risk Score: 7.3    Current PCP: Manav Wilson, DO    Additional Case Management Notes: Transfer from [facility]. At [facility], was found to be anemic and received 1 unit PRBC, noted to be lethargic with decreased mentation. Hgb dropped from 6.7 to 5.9. On arrival to [facility], hgb 7.4. Creat 2.9. Hospitalist following. Palliative care to eval. Strict bedrest. Telemetry. Asa, bumex, coreg, hydralazine and imdur held.  US renal and ECHO pending.     Procedures:    ECHO: ordered     Imagin/13 US Renal: ordered     Patient Goals/Plan/Treatment Preferences: From [facility]. CM assessment deferred to SW. SW consult placed.

## 2024-06-13 NOTE — PALLIATIVE CARE
Advance Care Planning     Palliative Team Advance Care Planning (ACP) Conversation    Date of Conversation: 06/13/24    Individuals present for the conversation: Patient, Brother Adrian, and Sister in law Aminata     ACP documents on file prior to discussion:  -None    Previously completed document/s not on file:  Living will and POA document was completed previously but it is not available for review. This writer requested a copy of the document for patient's chart. Adrian able to give this RN copy    Healthcare Decision Maker:       Conversation Summary:                            Follow Up / Progress Note        Patient:   Berenice Fitzgerald  YOB: 1950  Age:  73 y.o.  [facility]:  [facility]  MRN:  [id number]         Family/Patient Discussion:  Met with patient, Adrian, and Aminata in room. Patient unable to inform reason for admission. Patient is very pleasantly confused. Asked to discuss code status conversation that patient had with Dr Anne yesterday. Patient unable to follow conversation. Patient starts singing during conversation.  Seems to agree with any questions. Informed Adrian that this RN would not be able to let patient make medical decisions for herself. Jessica state that patient is a little more coherent in the morning. Adrian asking if this Rn would be able to return tomorrow. Jessica stated the would be available at 0730 tomorrow. Informed family that patient would remain a full code until decision is made. Agreed for this RN to return at that time.   Adrian presented copy of patient's Living will and Medical POA. Copies made for chart.    Plan/Follow-Up:  Continue with current plan of care. Plan to attempt to discuss with patient tomorrow more per family request. Will meet with patient and family at 0730         Electronically signed by Karla Feranndez RN on 6/13/2024 at 2:24 PM             Palliative Care Office: [phone]          Documentation Completed:  -Power of

## 2024-06-13 NOTE — PROCEDURES
PROCEDURE NOTE  Date: 6/13/2024   Name: Berenice Fitzgerald  YOB: 1950    Procedures      EKG was completed and handed to Mildred GÓMEZ

## 2024-06-13 NOTE — CONSENT
Informed Consent for Blood Component Transfusion Note    I have discussed with the patient the rationale for blood component transfusion; its benefits in treating or preventing fatigue, organ damage, or death; and its risk which includes mild transfusion reactions, rare risk of blood borne infection, or more serious but rare reactions. I have discussed the alternatives to transfusion, including the risk and consequences of not receiving transfusion. The patient had an opportunity to ask questions and had agreed to proceed with transfusion of blood components.    Electronically signed by Manav Anne MD on 6/13/24 at 3:56 AM EDT

## 2024-06-13 NOTE — CARE COORDINATION
6/13/24, 2:08 PM EDT  Discharge Planning Evaluation  Social work consult received, patient from [facility].   Patient/Family preference is to return to assisted living, per brother.    Would patient be willing to go to a skilled facility if needed: No.  Is there skilled care available at current facility: No.  Barriers to return to current living situation: None  Spoke with Rabia at the facility.  Patient bed hold: Yes  Anticipated transport plan: Ambulance v. Brother (pending patient's mentation and ability to move)  Patient's Healthcare Decision Maker: Legal Next of Kin- Brother       06/13/24 1191   Service Assessment   Patient Orientation Self   Cognition Dementia / Early Alzheimer's   History Provided By Child/Family   Primary Caregiver Other (Comment)  (AL at [facility])   Support Systems Family Members   Patient's Healthcare Decision Maker is: Legal Next of Kin   PCP Verified by CM Yes   Last Visit to PCP Within last 3 months   Prior Functional Level Assistance with the following:;Bathing;Dressing;Housework;Shopping;Mobility;Cooking   Current Functional Level Assistance with the following:;Bathing;Dressing;Toileting;Cooking;Housework;Shopping;Mobility   Can patient return to prior living arrangement Yes   Ability to make needs known: Fair   Family able to assist with home care needs: No   Would you like for me to discuss the discharge plan with any other family members/significant others, and if so, who? Yes  (Brother)   Financial Resources Medicare   Community Resources Assisted Living;Transportation   Discharge Planning   Type of Residence Assisted living   Living Arrangements Other (Comment)  (Alone at AL)   Current Services Prior To Admission Durable Medical Equipment;Extended Care Facility;Transportation   Current DME Prior to Arrival Wheelchair   Potential Assistance Needed Transportation   DME Ordered? No   Potential Assistance Purchasing Medications No   Type of Home Care Services None

## 2024-06-13 NOTE — PALLIATIVE CARE
Initial Evaluation        Patient:   Berenice Fitzgerald  YOB: 1950  Age:  73 y.o.  Room:  [facility]13/013-A  MRN:  [id number]   Acct: [id number]    Date of Admission:  6/13/2024  2:27 AM  Date of Service:  6/13/2024  Completed By:  Karla Fernandez RN                 Reason for Palliative Care Evaluation:-               [x] Code Status Discussion              [] Goals of Care              [] Pain/Symptom Management               [] Emotional Support              [] Other:                  Current Issues:-   []  Pain  []  Fatigue  []  Nausea  []  Anxiety  []  Depression  []  Shortness of Breath  []  Constipation  []  Appetite  [x]  Other:              Advance Directives:-  none  [] [place] DNR Form  [] Living Will  [] Medical POA              Current Code Status:-     [x] Full Resuscitation  [] DNR-Comfort Care-Arrest  [] DNR-Comfort Care       [] Limited Resuscitation             [] No CPR            [] No shock            [] No ET intubation/reintubation            [] No resuscitative medications            [] Other limitation:                 Goals of care evaluation:-          Goals of care discussed with:  [] Patient independently  [] Patient and Family  [] Family or Healthcare DPOA independently  [x] Unable to discuss with patient, family/DPOA not present      History:-    Patient a direct admit from [facility]. Went to ER due to multiple falls. Patient was also found to be anemic at [facility], no obvious signs of bleeding. CT from [facility] revealed subcutaneous edema from bilateral hips with suspicion for subcutaneous hematoma on right side. At assisted living facility, patient was a DNRCC. Patient and brother spoke with admitting physician and patient requested to be a full code. PMH includes HLD, HTN, insomnia, CKD, CHF, recurrent falls, dementia. Noted that patient is only oriented to self.        Family/Patient Discussion:-    On unit to speak with patient/family. Per primary RN patient's

## 2024-06-14 ENCOUNTER — APPOINTMENT (OUTPATIENT)
Dept: CT IMAGING | Age: 74
DRG: 811 | End: 2024-06-14
Attending: INTERNAL MEDICINE
Payer: MEDICARE

## 2024-06-14 LAB
ANION GAP SERPL CALC-SCNC: 14 MEQ/L (ref 8–16)
BACTERIA: ABNORMAL
BILIRUB UR QL STRIP: NEGATIVE
BUN SERPL-MCNC: 34 MG/DL (ref 7–22)
CALCIUM SERPL-MCNC: 8.8 MG/DL (ref 8.5–10.5)
CASTS #/AREA URNS LPF: ABNORMAL /LPF
CASTS #/AREA URNS LPF: ABNORMAL /LPF
CHARACTER UR: CLEAR
CHARCOAL URNS QL MICRO: ABNORMAL
CHLORIDE 24H UR-SRATE: 25 MEQ/L
CHLORIDE SERPL-SCNC: 100 MEQ/L (ref 98–111)
CO2 SERPL-SCNC: 23 MEQ/L (ref 23–33)
COLOR UR: YELLOW
CREAT SERPL-MCNC: 3 MG/DL (ref 0.4–1.2)
CRYSTALS URNS QL MICRO: ABNORMAL
DEPRECATED RDW RBC AUTO: 55.4 FL (ref 35–45)
EPITHELIAL CELLS, UA: ABNORMAL /HPF
ERYTHROCYTE [DISTWIDTH] IN BLOOD BY AUTOMATED COUNT: 16.7 % (ref 11.5–14.5)
GFR SERPL CREATININE-BSD FRML MDRD: 16 ML/MIN/1.73M2
GLUCOSE SERPL-MCNC: 102 MG/DL (ref 70–108)
GLUCOSE UR QL STRIP.AUTO: NEGATIVE MG/DL
HCT VFR BLD AUTO: 21.8 % (ref 37–47)
HCT VFR BLD AUTO: 23 % (ref 37–47)
HCT VFR BLD AUTO: 23.3 % (ref 37–47)
HCT VFR BLD AUTO: 24.5 % (ref 37–47)
HGB BLD-MCNC: 7 GM/DL (ref 12–16)
HGB BLD-MCNC: 7.5 GM/DL (ref 12–16)
HGB BLD-MCNC: 7.8 GM/DL (ref 12–16)
HGB BLD-MCNC: 8.1 GM/DL (ref 12–16)
HGB UR QL STRIP.AUTO: NEGATIVE
KETONES UR QL STRIP.AUTO: NEGATIVE
LEUKOCYTE ESTERASE UR QL STRIP.AUTO: NEGATIVE
MCH RBC QN AUTO: 29.8 PG (ref 26–33)
MCHC RBC AUTO-ENTMCNC: 32.6 GM/DL (ref 32.2–35.5)
MCV RBC AUTO: 91.3 FL (ref 81–99)
NITRITE UR QL STRIP.AUTO: NEGATIVE
OSMOLALITY UR: 456 MOSMOL/KG (ref 250–750)
PH UR STRIP.AUTO: 6 [PH] (ref 5–9)
PLATELET # BLD AUTO: 185 THOU/MM3 (ref 130–400)
PMV BLD AUTO: 10 FL (ref 9.4–12.4)
POTASSIUM SERPL-SCNC: 4 MEQ/L (ref 3.5–5.2)
POTASSIUM UR-SCNC: 54.9 MEQ/L
PROT UR STRIP.AUTO-MCNC: 300 MG/DL
RBC # BLD AUTO: 2.52 MILL/MM3 (ref 4.2–5.4)
RBC #/AREA URNS HPF: ABNORMAL /HPF
RENAL EPI CELLS #/AREA URNS HPF: ABNORMAL /[HPF]
SODIUM SERPL-SCNC: 137 MEQ/L (ref 135–145)
SODIUM UR-SCNC: 34 MEQ/L
SPECIFIC GRAVITY UA: 1.02 (ref 1–1.03)
UROBILINOGEN, URINE: 0.2 EU/DL (ref 0–1)
WBC # BLD AUTO: 10.3 THOU/MM3 (ref 4.8–10.8)
WBC #/AREA URNS HPF: ABNORMAL /HPF
YEAST LIKE FUNGI URNS QL MICRO: ABNORMAL

## 2024-06-14 PROCEDURE — 87086 URINE CULTURE/COLONY COUNT: CPT

## 2024-06-14 PROCEDURE — 92523 SPEECH SOUND LANG COMPREHEN: CPT

## 2024-06-14 PROCEDURE — 81001 URINALYSIS AUTO W/SCOPE: CPT

## 2024-06-14 PROCEDURE — 73700 CT LOWER EXTREMITY W/O DYE: CPT

## 2024-06-14 PROCEDURE — 85014 HEMATOCRIT: CPT

## 2024-06-14 PROCEDURE — 84133 ASSAY OF URINE POTASSIUM: CPT

## 2024-06-14 PROCEDURE — 2580000003 HC RX 258

## 2024-06-14 PROCEDURE — 2060000000 HC ICU INTERMEDIATE R&B

## 2024-06-14 PROCEDURE — 97530 THERAPEUTIC ACTIVITIES: CPT

## 2024-06-14 PROCEDURE — 97129 THER IVNTJ 1ST 15 MIN: CPT

## 2024-06-14 PROCEDURE — 80048 BASIC METABOLIC PNL TOTAL CA: CPT

## 2024-06-14 PROCEDURE — 82436 ASSAY OF URINE CHLORIDE: CPT

## 2024-06-14 PROCEDURE — 97162 PT EVAL MOD COMPLEX 30 MIN: CPT

## 2024-06-14 PROCEDURE — 85027 COMPLETE CBC AUTOMATED: CPT

## 2024-06-14 PROCEDURE — 51798 US URINE CAPACITY MEASURE: CPT

## 2024-06-14 PROCEDURE — 51701 INSERT BLADDER CATHETER: CPT

## 2024-06-14 PROCEDURE — 36415 COLL VENOUS BLD VENIPUNCTURE: CPT

## 2024-06-14 PROCEDURE — 85018 HEMOGLOBIN: CPT

## 2024-06-14 PROCEDURE — 99233 SBSQ HOSP IP/OBS HIGH 50: CPT | Performed by: INTERNAL MEDICINE

## 2024-06-14 PROCEDURE — 83935 ASSAY OF URINE OSMOLALITY: CPT

## 2024-06-14 PROCEDURE — 6370000000 HC RX 637 (ALT 250 FOR IP)

## 2024-06-14 PROCEDURE — 92610 EVALUATE SWALLOWING FUNCTION: CPT

## 2024-06-14 PROCEDURE — 84300 ASSAY OF URINE SODIUM: CPT

## 2024-06-14 RX ORDER — TRAZODONE HYDROCHLORIDE 50 MG/1
50 TABLET ORAL ONCE
Status: COMPLETED | OUTPATIENT
Start: 2024-06-14 | End: 2024-06-14

## 2024-06-14 RX ORDER — SODIUM CHLORIDE 9 MG/ML
INJECTION, SOLUTION INTRAVENOUS CONTINUOUS
Status: ACTIVE | OUTPATIENT
Start: 2024-06-14 | End: 2024-06-14

## 2024-06-14 RX ADMIN — MEMANTINE HYDROCHLORIDE 10 MG: 10 TABLET ORAL at 09:58

## 2024-06-14 RX ADMIN — SODIUM CHLORIDE, PRESERVATIVE FREE 10 ML: 5 INJECTION INTRAVENOUS at 09:58

## 2024-06-14 RX ADMIN — CARVEDILOL 12.5 MG: 6.25 TABLET, FILM COATED ORAL at 18:44

## 2024-06-14 RX ADMIN — ATORVASTATIN CALCIUM 40 MG: 40 TABLET, FILM COATED ORAL at 20:28

## 2024-06-14 RX ADMIN — MEMANTINE HYDROCHLORIDE 10 MG: 10 TABLET ORAL at 20:28

## 2024-06-14 RX ADMIN — HYDRALAZINE HYDROCHLORIDE 25 MG: 25 TABLET ORAL at 15:00

## 2024-06-14 RX ADMIN — TRAZODONE HYDROCHLORIDE 50 MG: 50 TABLET ORAL at 02:36

## 2024-06-14 RX ADMIN — TRAZODONE HYDROCHLORIDE 50 MG: 50 TABLET ORAL at 20:29

## 2024-06-14 RX ADMIN — SERTRALINE 100 MG: 100 TABLET, FILM COATED ORAL at 09:58

## 2024-06-14 RX ADMIN — ACETAMINOPHEN 650 MG: 325 TABLET ORAL at 02:36

## 2024-06-14 RX ADMIN — HYDRALAZINE HYDROCHLORIDE 25 MG: 25 TABLET ORAL at 20:28

## 2024-06-14 RX ADMIN — CARVEDILOL 12.5 MG: 6.25 TABLET, FILM COATED ORAL at 09:57

## 2024-06-14 RX ADMIN — SODIUM CHLORIDE: 9 INJECTION, SOLUTION INTRAVENOUS at 13:05

## 2024-06-14 RX ADMIN — HYDRALAZINE HYDROCHLORIDE 25 MG: 25 TABLET ORAL at 07:00

## 2024-06-14 RX ADMIN — ISOSORBIDE MONONITRATE 30 MG: 30 TABLET, EXTENDED RELEASE ORAL at 18:44

## 2024-06-14 RX ADMIN — EZETIMIBE 10 MG: 10 TABLET ORAL at 20:28

## 2024-06-14 ASSESSMENT — PAIN DESCRIPTION - FREQUENCY: FREQUENCY: CONTINUOUS

## 2024-06-14 ASSESSMENT — PAIN DESCRIPTION - LOCATION
LOCATION: BACK
LOCATION: GENERALIZED

## 2024-06-14 ASSESSMENT — PAIN SCALES - GENERAL
PAINLEVEL_OUTOF10: 2
PAINLEVEL_OUTOF10: 0
PAINLEVEL_OUTOF10: 3
PAINLEVEL_OUTOF10: 0

## 2024-06-14 ASSESSMENT — PAIN DESCRIPTION - ONSET: ONSET: ON-GOING

## 2024-06-14 ASSESSMENT — PAIN DESCRIPTION - ORIENTATION: ORIENTATION: LOWER

## 2024-06-14 ASSESSMENT — PAIN DESCRIPTION - DESCRIPTORS
DESCRIPTORS: ACHING;SORE
DESCRIPTORS: ACHING

## 2024-06-14 ASSESSMENT — PAIN - FUNCTIONAL ASSESSMENT
PAIN_FUNCTIONAL_ASSESSMENT: ACTIVITIES ARE NOT PREVENTED
PAIN_FUNCTIONAL_ASSESSMENT: ACTIVITIES ARE NOT PREVENTED

## 2024-06-14 ASSESSMENT — PAIN DESCRIPTION - PAIN TYPE: TYPE: ACUTE PAIN

## 2024-06-14 NOTE — CONSULTS
Nephrology Consult Note  [facility]    PatPt Name: Berenice Fitzgerald  MRN: [id number]  [id number]  YOB: 1950  Admit Date: 6/13/2024  2:27 AM  Date of evaluation: 6/14/2024  Primary Care Physician: Manav Wilson DO   4K-13/013-A     Nephrologist:  Merissa Garcia MD,    Reason for Consult:  sada  Requesting Physician:  hospitalist  Primary Care Physician  Manav Wilson DO    Chief Complaint:  falls  History Obtained From:  patient / family/ staff    History of Present Ilness:      Patient is a 73 y.o. female with PMHx of CHF, CKD, dementia, insomnia, depression, HTN, HLD who presented to [facility] as a transfer from [facility] on 6/13 with anemia.      Per HPI  Patient currently resides at SNF and has been experiencing falls since 5/25.  Patient notes she has some blackening of vision upon standing up.  Most recent fall was on 6/12 with concern for possible hip dislocation per SNF facility.  Patient was initially brought to [facility].    They performed CT A/P that showed subcutaneous edema of bilateral hips with suspicion for subcutaneous hematoma on right.  CXR showed cardiomegaly.  Patient noted to have low hemoglobin at 6.7 with decreased to 5.9.  Patient received PRBC and was transferred to Psychiatric for further evaluation and care.      Patient has had repeat labs which are pending. Denied urinary symptoms.         PMHx: No past medical history on file.    PSHx: No past surgical history on file.    Family History: No family history on file.    Social History:      Allergies:  Patient has no known allergies.    Current Medications:    0.9 % sodium chloride infusion, Continuous  sodium chloride flush 0.9 % injection 5-40 mL, 2 times per day  sodium chloride flush 0.9 % injection 5-40 mL, PRN  0.9 % sodium chloride infusion, PRN  ondansetron (ZOFRAN-ODT) disintegrating tablet 4 mg, Q8H PRN   Or  ondansetron (ZOFRAN) injection 4 mg, Q6H PRN  polyethylene glycol

## 2024-06-14 NOTE — PALLIATIVE CARE
Advance Care Planning     Palliative Team Advance Care Planning (ACP) Conversation    Date of Conversation: 06/14/24    Individuals present for the conversation: Patient, Brother Adrian, and Sister in Law Aminata     Healthcare Decision Maker:    Primary Decision Maker: Adrian Acosta - Brother/Sister - [phone]    Secondary Decision Maker: AlidasaramegganstaceyAminata - Other Relative     Conversation Summary:                             Follow Up / Progress Note        Patient:   Berenice Fitzgerald  YOB: 1950  Age:  73 y.o.  Room:  [address] [place] [address]  MRN:  [id number]         Family/Patient Discussion:  Met with patient, Adrian, and Aminata in patient's room. Patient alert, continues to not answer all questions appropriately. Adrian attempting to discuss code status again with patient. Adrian gave option of full code vs allowing natural death. Patient stated \"well I think she would just want to go and pass on\". Asked patient what the she would want. Patient  stated \"well I would want that too\". Confirmed with patient that she would prefer a natural death rather than chest compressions, patient agreed. Asked patient if this Rn could clarify a few things with Adrian, patient agreed. Continued rest of conversation with Adrian. Educated on DNRCCA with/without intubation vs DNRCC. Adrian believes patient is okay to continue with treatment options at this time, agreed to DNRCCA. Adrian also does not believe that patient would want to be intubated for respiratory distress/failure. Adrian agreed with DNRCCA and DNI. Informed Adrian that this RN can ensure appropriate docuemtns are made. Adrian and patient denied further questions at this time.     Perfect serve message sent to Dr Baker. Updated Valarie RN.     DNRCCA and DNI signed. Faxed to medical records, copy placed in chart, original and copies given to Adrian. Also faxed Living Will and Medical POA to medical records.       Plan/Follow-Up:  Code status updated to LIMITED NO X4. Will

## 2024-06-14 NOTE — CARE COORDINATION
6/14/24, 11:46 AM EDT    DISCHARGE ON GOING EVALUATION    [facility] day: 1  Location: -13/013-A Reason for admit: Anemia [D64.9]     Procedures:   6/13 ECHO: pending   6/13 1 unit PRBC     Imaging since last note:   6/13 US Renal: Normal sized kidneys without hydronephrosis. Right kidney is echogenic suggestive of medical renal disease. 7 mm echogenicity with posterior acoustic shadowing in the right kidney suspicious for a nonobstructive renal stone.  6/13 US Extremity Left Non Vascular: Heterogeneous subcutaneous structure, likely an organizing hematoma.   6/13 CXR: Negativve  6/14 CT Left Hip: pending     Barriers to Discharge: Hospitalist and Palliative Care following. Nephrology to see. PT/OT to eval. Urine studies pending. Start IVF. Coreg. IV hydralazine prn and PO hydralazine TID. Imdur unheld. Asa and bumex remain on hold. Creat 3.0 (2.9)   H&H q6h. Received 1 unit PRBC last HS. Current Hgb 8.1. Oriented to person only. Restless and anxious. Telesitter at bedside.   Vitals:    06/14/24 0330 06/14/24 0430 06/14/24 0700 06/14/24 0915   BP: (!) 176/119 (!) 145/77 (!) 178/104 (!) 225/80   Pulse: 94   80   Resp: 18   18   Temp: 98.4 °F (36.9 °C)   98.9 °F (37.2 °C)   TempSrc: Oral   Oral   SpO2: 96%   96%   Weight: 80.4 kg (177 lb 4 oz)      Height:          Latest Reference Range & Units 06/13/24 03:19 06/13/24 09:18 06/13/24 17:02 06/13/24 21:32 06/14/24 03:41 06/14/24 10:31   Hemoglobin Quant 12.0 - 16.0 gm/dl 7.4 (L) 7.1 (L) 6.8 (LL) 6.3 (LL) 7.5 (L) 8.1 (L)   Hematocrit 37.0 - 47.0 % 22.6 (L) 21.6 (L) 20.9 (LL) 19.3 (LL) 23.0 (L) 24.5 (L)     PCP: Manav Wilson,   Readmission Risk Score: 16.2    Patient Goals/Plan/Treatment Preferences: Plans to return to [facility]. [facility].     11:45AM: Spoke w/ Dr. Baker. He informed CM that patient will not discharge over the weekend.

## 2024-06-15 ENCOUNTER — APPOINTMENT (OUTPATIENT)
Dept: CT IMAGING | Age: 74
DRG: 811 | End: 2024-06-15
Attending: INTERNAL MEDICINE
Payer: MEDICARE

## 2024-06-15 LAB
ALBUMIN SERPL BCG-MCNC: 3.1 G/DL (ref 3.5–5.1)
ALP SERPL-CCNC: 44 U/L (ref 38–126)
ALT SERPL W/O P-5'-P-CCNC: 18 U/L (ref 11–66)
ANION GAP SERPL CALC-SCNC: 10 MEQ/L (ref 8–16)
AST SERPL-CCNC: 21 U/L (ref 5–40)
BILIRUB SERPL-MCNC: 0.4 MG/DL (ref 0.3–1.2)
BUN SERPL-MCNC: 37 MG/DL (ref 7–22)
CALCIUM SERPL-MCNC: 8.8 MG/DL (ref 8.5–10.5)
CHLORIDE SERPL-SCNC: 103 MEQ/L (ref 98–111)
CK SERPL-CCNC: 41 U/L (ref 30–135)
CO2 SERPL-SCNC: 23 MEQ/L (ref 23–33)
CREAT SERPL-MCNC: 3.4 MG/DL (ref 0.4–1.2)
GFR SERPL CREATININE-BSD FRML MDRD: 14 ML/MIN/1.73M2
GLUCOSE SERPL-MCNC: 122 MG/DL (ref 70–108)
HCT VFR BLD AUTO: 21.3 % (ref 37–47)
HCT VFR BLD AUTO: 22.3 % (ref 37–47)
HGB BLD-MCNC: 6.9 GM/DL (ref 12–16)
HGB BLD-MCNC: 7 GM/DL (ref 12–16)
MAGNESIUM SERPL-MCNC: 2.2 MG/DL (ref 1.6–2.4)
PHOSPHATE SERPL-MCNC: 4.2 MG/DL (ref 2.4–4.7)
POTASSIUM SERPL-SCNC: 4 MEQ/L (ref 3.5–5.2)
PROT SERPL-MCNC: 5.7 G/DL (ref 6.1–8)
SODIUM SERPL-SCNC: 136 MEQ/L (ref 135–145)

## 2024-06-15 PROCEDURE — 99232 SBSQ HOSP IP/OBS MODERATE 35: CPT | Performed by: INTERNAL MEDICINE

## 2024-06-15 PROCEDURE — 2060000000 HC ICU INTERMEDIATE R&B

## 2024-06-15 PROCEDURE — 83735 ASSAY OF MAGNESIUM: CPT

## 2024-06-15 PROCEDURE — 73700 CT LOWER EXTREMITY W/O DYE: CPT

## 2024-06-15 PROCEDURE — 6370000000 HC RX 637 (ALT 250 FOR IP)

## 2024-06-15 PROCEDURE — 85014 HEMATOCRIT: CPT

## 2024-06-15 PROCEDURE — 2580000003 HC RX 258: Performed by: INTERNAL MEDICINE

## 2024-06-15 PROCEDURE — 83521 IG LIGHT CHAINS FREE EACH: CPT

## 2024-06-15 PROCEDURE — 84155 ASSAY OF PROTEIN SERUM: CPT

## 2024-06-15 PROCEDURE — 84165 PROTEIN E-PHORESIS SERUM: CPT

## 2024-06-15 PROCEDURE — 82550 ASSAY OF CK (CPK): CPT

## 2024-06-15 PROCEDURE — 74176 CT ABD & PELVIS W/O CONTRAST: CPT

## 2024-06-15 PROCEDURE — 85018 HEMOGLOBIN: CPT

## 2024-06-15 PROCEDURE — 2580000003 HC RX 258

## 2024-06-15 PROCEDURE — 36415 COLL VENOUS BLD VENIPUNCTURE: CPT

## 2024-06-15 PROCEDURE — 51798 US URINE CAPACITY MEASURE: CPT

## 2024-06-15 PROCEDURE — 84100 ASSAY OF PHOSPHORUS: CPT

## 2024-06-15 PROCEDURE — 6360000002 HC RX W HCPCS

## 2024-06-15 PROCEDURE — 80053 COMPREHEN METABOLIC PANEL: CPT

## 2024-06-15 RX ORDER — ALBUTEROL SULFATE 90 UG/1
2 AEROSOL, METERED RESPIRATORY (INHALATION) EVERY 4 HOURS PRN
Status: DISCONTINUED | OUTPATIENT
Start: 2024-06-15 | End: 2024-06-24 | Stop reason: HOSPADM

## 2024-06-15 RX ORDER — MEMANTINE HYDROCHLORIDE 5 MG/1
5 TABLET ORAL 2 TIMES DAILY
Status: DISCONTINUED | OUTPATIENT
Start: 2024-06-15 | End: 2024-06-24 | Stop reason: HOSPADM

## 2024-06-15 RX ORDER — SODIUM CHLORIDE 9 MG/ML
INJECTION, SOLUTION INTRAVENOUS CONTINUOUS
Status: DISCONTINUED | OUTPATIENT
Start: 2024-06-15 | End: 2024-06-17

## 2024-06-15 RX ADMIN — ISOSORBIDE MONONITRATE 30 MG: 30 TABLET, EXTENDED RELEASE ORAL at 08:45

## 2024-06-15 RX ADMIN — HYDRALAZINE HYDROCHLORIDE 25 MG: 25 TABLET ORAL at 04:56

## 2024-06-15 RX ADMIN — CARVEDILOL 12.5 MG: 6.25 TABLET, FILM COATED ORAL at 17:10

## 2024-06-15 RX ADMIN — CARVEDILOL 12.5 MG: 6.25 TABLET, FILM COATED ORAL at 08:45

## 2024-06-15 RX ADMIN — MEMANTINE HYDROCHLORIDE 5 MG: 10 TABLET ORAL at 20:02

## 2024-06-15 RX ADMIN — SODIUM CHLORIDE: 9 INJECTION, SOLUTION INTRAVENOUS at 06:23

## 2024-06-15 RX ADMIN — HYDRALAZINE HYDROCHLORIDE 25 MG: 25 TABLET ORAL at 14:39

## 2024-06-15 RX ADMIN — HYDRALAZINE HYDROCHLORIDE 5 MG: 20 INJECTION INTRAMUSCULAR; INTRAVENOUS at 00:26

## 2024-06-15 RX ADMIN — ACETAMINOPHEN 650 MG: 325 TABLET ORAL at 20:07

## 2024-06-15 RX ADMIN — ATORVASTATIN CALCIUM 40 MG: 40 TABLET, FILM COATED ORAL at 20:02

## 2024-06-15 RX ADMIN — MEMANTINE HYDROCHLORIDE 10 MG: 10 TABLET ORAL at 08:45

## 2024-06-15 RX ADMIN — SERTRALINE 100 MG: 100 TABLET, FILM COATED ORAL at 08:45

## 2024-06-15 RX ADMIN — TRAZODONE HYDROCHLORIDE 50 MG: 50 TABLET ORAL at 20:02

## 2024-06-15 RX ADMIN — HYDRALAZINE HYDROCHLORIDE 25 MG: 25 TABLET ORAL at 20:03

## 2024-06-15 RX ADMIN — SODIUM CHLORIDE, PRESERVATIVE FREE 10 ML: 5 INJECTION INTRAVENOUS at 08:45

## 2024-06-15 RX ADMIN — EZETIMIBE 10 MG: 10 TABLET ORAL at 20:02

## 2024-06-15 RX ADMIN — SODIUM CHLORIDE: 9 INJECTION, SOLUTION INTRAVENOUS at 18:58

## 2024-06-15 ASSESSMENT — PAIN DESCRIPTION - DESCRIPTORS
DESCRIPTORS: SHARP
DESCRIPTORS: ACHING

## 2024-06-15 ASSESSMENT — PAIN DESCRIPTION - LOCATION
LOCATION: BACK
LOCATION: LEG;HIP

## 2024-06-15 ASSESSMENT — PAIN SCALES - GENERAL
PAINLEVEL_OUTOF10: 3
PAINLEVEL_OUTOF10: 0
PAINLEVEL_OUTOF10: 0
PAINLEVEL_OUTOF10: 6
PAINLEVEL_OUTOF10: 0

## 2024-06-15 ASSESSMENT — PAIN - FUNCTIONAL ASSESSMENT
PAIN_FUNCTIONAL_ASSESSMENT: ACTIVITIES ARE NOT PREVENTED
PAIN_FUNCTIONAL_ASSESSMENT: PREVENTS OR INTERFERES SOME ACTIVE ACTIVITIES AND ADLS

## 2024-06-15 ASSESSMENT — PAIN DESCRIPTION - ORIENTATION
ORIENTATION: RIGHT;LEFT
ORIENTATION: LOWER

## 2024-06-15 ASSESSMENT — PAIN DESCRIPTION - PAIN TYPE: TYPE: CHRONIC PAIN

## 2024-06-15 NOTE — RT PROTOCOL NOTE
RT Inhaler-Nebulizer Bronchodilator Protocol Note    There is a bronchodilator order in the chart from a provider indicating to follow the RT Bronchodilator Protocol and there is an “Initiate RT Inhaler-Nebulizer Bronchodilator Protocol” order as well (see protocol at bottom of note).    CXR Findings:  XR CHEST PORTABLE    Result Date: 6/13/2024  No acute intrathoracic process. **This report has been created using voice recognition software. It may contain minor errors which are inherent in voice recognition technology.** Electronically signed by Dr. Rudolph Glover      The findings from the last RT Protocol Assessment were as follows:   History Pulmonary Disease: None or smoker <15 pack years  Respiratory Pattern: Regular pattern and RR 12-20 bpm  Breath Sounds: Clear breath sounds  Cough: Strong, spontaneous, non-productive  Indication for Bronchodilator Therapy: None  Bronchodilator Assessment Score: 0    Aerosolized bronchodilator medication orders have been revised according to the RT Inhaler-Nebulizer Bronchodilator Protocol below.    Respiratory Therapist to perform RT Therapy Protocol Assessment initially then follow the protocol.  Repeat RT Therapy Protocol Assessment PRN for score 0-3 or on second treatment, BID, and PRN for scores above 3.    No Indications - adjust the frequency to every 6 hours PRN wheezing or bronchospasm, if no treatments needed after 48 hours then discontinue using Per Protocol order mode.     If indication present, adjust the RT bronchodilator orders based on the Bronchodilator Assessment Score as indicated below.  Use Inhaler orders unless patient has one or more of the following: on home nebulizer, not able to hold breath for 10 seconds, is not alert and oriented, cannot activate and use MDI correctly, or respiratory rate 25 breaths per minute or more, then use the equivalent nebulizer order(s) with same Frequency and PRN reasons based on the score.  If a patient is on this

## 2024-06-16 LAB
ANION GAP SERPL CALC-SCNC: 11 MEQ/L (ref 8–16)
APTT PPP: 26.7 SECONDS (ref 22–38)
BACTERIA UR CULT: NORMAL
BUN SERPL-MCNC: 34 MG/DL (ref 7–22)
CALCIUM SERPL-MCNC: 8.3 MG/DL (ref 8.5–10.5)
CHLORIDE SERPL-SCNC: 104 MEQ/L (ref 98–111)
CO2 SERPL-SCNC: 20 MEQ/L (ref 23–33)
CREAT SERPL-MCNC: 3 MG/DL (ref 0.4–1.2)
CREAT UR-MCNC: 100 MG/DL
DEPRECATED RDW RBC AUTO: 55.8 FL (ref 35–45)
DEPRECATED RDW RBC AUTO: 56.2 FL (ref 35–45)
ERYTHROCYTE [DISTWIDTH] IN BLOOD BY AUTOMATED COUNT: 16.5 % (ref 11.5–14.5)
ERYTHROCYTE [DISTWIDTH] IN BLOOD BY AUTOMATED COUNT: 16.7 % (ref 11.5–14.5)
GFR SERPL CREATININE-BSD FRML MDRD: 16 ML/MIN/1.73M2
GLUCOSE SERPL-MCNC: 86 MG/DL (ref 70–108)
HCT VFR BLD AUTO: 20.8 % (ref 37–47)
HCT VFR BLD AUTO: 25.4 % (ref 37–47)
HCT VFR BLD AUTO: 26.1 % (ref 37–47)
HGB BLD-MCNC: 6.5 GM/DL (ref 12–16)
HGB BLD-MCNC: 8.2 GM/DL (ref 12–16)
HGB BLD-MCNC: 8.2 GM/DL (ref 12–16)
INR PPP: 0.95 (ref 0.85–1.13)
MAGNESIUM SERPL-MCNC: 2 MG/DL (ref 1.6–2.4)
MCH RBC QN AUTO: 29.8 PG (ref 26–33)
MCH RBC QN AUTO: 30.6 PG (ref 26–33)
MCHC RBC AUTO-ENTMCNC: 31.3 GM/DL (ref 32.2–35.5)
MCHC RBC AUTO-ENTMCNC: 32.3 GM/DL (ref 32.2–35.5)
MCV RBC AUTO: 94.8 FL (ref 81–99)
MCV RBC AUTO: 95.4 FL (ref 81–99)
PATHOLOGIST REVIEW: ABNORMAL
PLATELET # BLD AUTO: 165 THOU/MM3 (ref 130–400)
PLATELET # BLD AUTO: 209 THOU/MM3 (ref 130–400)
PMV BLD AUTO: 10 FL (ref 9.4–12.4)
PMV BLD AUTO: 9.8 FL (ref 9.4–12.4)
POTASSIUM SERPL-SCNC: 3.9 MEQ/L (ref 3.5–5.2)
PROT UR-MCNC: 242.5 MG/DL
PROT/CREAT 24H UR: 2.43 MG/G{CREAT}
RBC # BLD AUTO: 2.18 MILL/MM3 (ref 4.2–5.4)
RBC # BLD AUTO: 2.68 MILL/MM3 (ref 4.2–5.4)
SODIUM SERPL-SCNC: 135 MEQ/L (ref 135–145)
WBC # BLD AUTO: 6.1 THOU/MM3 (ref 4.8–10.8)
WBC # BLD AUTO: 9.5 THOU/MM3 (ref 4.8–10.8)

## 2024-06-16 PROCEDURE — 6370000000 HC RX 637 (ALT 250 FOR IP)

## 2024-06-16 PROCEDURE — 83735 ASSAY OF MAGNESIUM: CPT

## 2024-06-16 PROCEDURE — 85610 PROTHROMBIN TIME: CPT

## 2024-06-16 PROCEDURE — 51701 INSERT BLADDER CATHETER: CPT

## 2024-06-16 PROCEDURE — 99233 SBSQ HOSP IP/OBS HIGH 50: CPT | Performed by: INTERNAL MEDICINE

## 2024-06-16 PROCEDURE — 85014 HEMATOCRIT: CPT

## 2024-06-16 PROCEDURE — 2060000000 HC ICU INTERMEDIATE R&B

## 2024-06-16 PROCEDURE — 36430 TRANSFUSION BLD/BLD COMPNT: CPT

## 2024-06-16 PROCEDURE — 85730 THROMBOPLASTIN TIME PARTIAL: CPT

## 2024-06-16 PROCEDURE — 82570 ASSAY OF URINE CREATININE: CPT

## 2024-06-16 PROCEDURE — 84156 ASSAY OF PROTEIN URINE: CPT

## 2024-06-16 PROCEDURE — 85027 COMPLETE CBC AUTOMATED: CPT

## 2024-06-16 PROCEDURE — 36415 COLL VENOUS BLD VENIPUNCTURE: CPT

## 2024-06-16 PROCEDURE — 80048 BASIC METABOLIC PNL TOTAL CA: CPT

## 2024-06-16 PROCEDURE — 85018 HEMOGLOBIN: CPT

## 2024-06-16 RX ORDER — SODIUM CHLORIDE 9 MG/ML
INJECTION, SOLUTION INTRAVENOUS PRN
Status: DISCONTINUED | OUTPATIENT
Start: 2024-06-16 | End: 2024-06-24

## 2024-06-16 RX ADMIN — EZETIMIBE 10 MG: 10 TABLET ORAL at 23:17

## 2024-06-16 RX ADMIN — CARVEDILOL 12.5 MG: 6.25 TABLET, FILM COATED ORAL at 09:40

## 2024-06-16 RX ADMIN — CARVEDILOL 12.5 MG: 6.25 TABLET, FILM COATED ORAL at 16:36

## 2024-06-16 RX ADMIN — ATORVASTATIN CALCIUM 40 MG: 40 TABLET, FILM COATED ORAL at 23:16

## 2024-06-16 RX ADMIN — TRAZODONE HYDROCHLORIDE 50 MG: 50 TABLET ORAL at 23:16

## 2024-06-16 RX ADMIN — ACETAMINOPHEN 650 MG: 325 TABLET ORAL at 09:42

## 2024-06-16 RX ADMIN — SERTRALINE 100 MG: 100 TABLET, FILM COATED ORAL at 09:40

## 2024-06-16 RX ADMIN — MEMANTINE HYDROCHLORIDE 5 MG: 10 TABLET ORAL at 23:16

## 2024-06-16 RX ADMIN — MEMANTINE HYDROCHLORIDE 5 MG: 10 TABLET ORAL at 09:40

## 2024-06-16 RX ADMIN — HYDRALAZINE HYDROCHLORIDE 25 MG: 25 TABLET ORAL at 23:16

## 2024-06-16 RX ADMIN — ISOSORBIDE MONONITRATE 30 MG: 30 TABLET, EXTENDED RELEASE ORAL at 09:40

## 2024-06-16 RX ADMIN — HYDRALAZINE HYDROCHLORIDE 25 MG: 25 TABLET ORAL at 16:36

## 2024-06-16 RX ADMIN — HYDRALAZINE HYDROCHLORIDE 25 MG: 25 TABLET ORAL at 06:42

## 2024-06-16 ASSESSMENT — PAIN DESCRIPTION - LOCATION: LOCATION: SHOULDER

## 2024-06-16 ASSESSMENT — PAIN SCALES - GENERAL
PAINLEVEL_OUTOF10: 3
PAINLEVEL_OUTOF10: 0

## 2024-06-16 ASSESSMENT — PAIN DESCRIPTION - ORIENTATION: ORIENTATION: LEFT

## 2024-06-17 ENCOUNTER — APPOINTMENT (OUTPATIENT)
Dept: CT IMAGING | Age: 74
DRG: 811 | End: 2024-06-17
Attending: INTERNAL MEDICINE
Payer: MEDICARE

## 2024-06-17 ENCOUNTER — APPOINTMENT (OUTPATIENT)
Dept: MRI IMAGING | Age: 74
DRG: 811 | End: 2024-06-17
Attending: INTERNAL MEDICINE
Payer: MEDICARE

## 2024-06-17 LAB
ALBUMIN SERPL BCG-MCNC: 2.9 G/DL (ref 3.5–5.1)
ANION GAP SERPL CALC-SCNC: 8 MEQ/L (ref 8–16)
BUN SERPL-MCNC: 31 MG/DL (ref 7–22)
CALCIUM SERPL-MCNC: 8.8 MG/DL (ref 8.5–10.5)
CHLORIDE SERPL-SCNC: 109 MEQ/L (ref 98–111)
CO2 SERPL-SCNC: 22 MEQ/L (ref 23–33)
CREAT SERPL-MCNC: 2.9 MG/DL (ref 0.4–1.2)
DEPRECATED RDW RBC AUTO: 54.9 FL (ref 35–45)
DEPRECATED RDW RBC AUTO: 56.6 FL (ref 35–45)
ECHO AO ASC DIAM: 3.5 CM
ECHO AO ASCENDING AORTA INDEX: 1.84 CM/M2
ECHO AV CUSP MM: 1.8 CM
ECHO AV PEAK GRADIENT: 14 MMHG
ECHO AV PEAK VELOCITY: 1.9 M/S
ECHO AV VELOCITY RATIO: 0.58
ECHO BSA: 1.91 M2
ECHO EST RA PRESSURE: 3 MMHG
ECHO LA AREA 2C: 21.9 CM2
ECHO LA AREA 4C: 21.9 CM2
ECHO LA DIAMETER INDEX: 2.11 CM/M2
ECHO LA DIAMETER: 4 CM
ECHO LA MAJOR AXIS: 5.5 CM
ECHO LA MINOR AXIS: 5.5 CM
ECHO LA VOL BP: 69 ML (ref 22–52)
ECHO LA VOL MOD A2C: 71 ML (ref 22–52)
ECHO LA VOL MOD A4C: 67 ML (ref 22–52)
ECHO LA VOL/BSA BIPLANE: 36 ML/M2 (ref 16–34)
ECHO LA VOLUME INDEX MOD A2C: 37 ML/M2 (ref 16–34)
ECHO LA VOLUME INDEX MOD A4C: 35 ML/M2 (ref 16–34)
ECHO LV FRACTIONAL SHORTENING: 29 % (ref 28–44)
ECHO LV INTERNAL DIMENSION DIASTOLE INDEX: 2.53 CM/M2
ECHO LV INTERNAL DIMENSION DIASTOLIC: 4.8 CM (ref 3.9–5.3)
ECHO LV INTERNAL DIMENSION SYSTOLIC INDEX: 1.79 CM/M2
ECHO LV INTERNAL DIMENSION SYSTOLIC: 3.4 CM
ECHO LV ISOVOLUMETRIC RELAXATION TIME (IVRT): 95 MS
ECHO LV IVSD: 1.3 CM (ref 0.6–0.9)
ECHO LV MASS 2D: 367.3 G (ref 67–162)
ECHO LV MASS INDEX 2D: 193.3 G/M2 (ref 43–95)
ECHO LV POSTERIOR WALL DIASTOLIC: 2.1 CM (ref 0.6–0.9)
ECHO LV RELATIVE WALL THICKNESS RATIO: 0.88
ECHO LVOT PEAK GRADIENT: 5 MMHG
ECHO LVOT PEAK VELOCITY: 1.1 M/S
ECHO MV A VELOCITY: 1.18 M/S
ECHO MV E DECELERATION TIME (DT): 324 MS
ECHO MV E VELOCITY: 0.67 M/S
ECHO MV E/A RATIO: 0.57
ECHO MV REGURGITANT PEAK GRADIENT: 74 MMHG
ECHO MV REGURGITANT PEAK VELOCITY: 4.3 M/S
ECHO PULMONARY ARTERY SYSTOLIC PRESSURE (PASP): 55 MMHG
ECHO PV MAX VELOCITY: 1 M/S
ECHO PV PEAK GRADIENT: 4 MMHG
ECHO RIGHT VENTRICULAR SYSTOLIC PRESSURE (RVSP): 53 MMHG
ECHO RV INTERNAL DIMENSION: 1.8 CM
ECHO RV TAPSE: 1.6 CM (ref 1.7–?)
ECHO TV E WAVE: 0.9 M/S
ECHO TV REGURGITANT MAX VELOCITY: 3.52 M/S
ECHO TV REGURGITANT PEAK GRADIENT: 50 MMHG
EKG ATRIAL RATE: 77 BPM
EKG P AXIS: 62 DEGREES
EKG P-R INTERVAL: 216 MS
EKG Q-T INTERVAL: 386 MS
EKG QRS DURATION: 100 MS
EKG QTC CALCULATION (BAZETT): 436 MS
EKG R AXIS: 10 DEGREES
EKG T AXIS: 146 DEGREES
EKG VENTRICULAR RATE: 77 BPM
ERYTHROCYTE [DISTWIDTH] IN BLOOD BY AUTOMATED COUNT: 17.2 % (ref 11.5–14.5)
ERYTHROCYTE [DISTWIDTH] IN BLOOD BY AUTOMATED COUNT: 17.7 % (ref 11.5–14.5)
GFR SERPL CREATININE-BSD FRML MDRD: 17 ML/MIN/1.73M2
GLUCOSE SERPL-MCNC: 114 MG/DL (ref 70–108)
HCT VFR BLD AUTO: 22.2 % (ref 37–47)
HCT VFR BLD AUTO: 24.3 % (ref 37–47)
HGB BLD-MCNC: 7.2 GM/DL (ref 12–16)
HGB BLD-MCNC: 7.7 GM/DL (ref 12–16)
MAGNESIUM SERPL-MCNC: 1.9 MG/DL (ref 1.6–2.4)
MCH RBC QN AUTO: 29.7 PG (ref 26–33)
MCH RBC QN AUTO: 30.6 PG (ref 26–33)
MCHC RBC AUTO-ENTMCNC: 31.7 GM/DL (ref 32.2–35.5)
MCHC RBC AUTO-ENTMCNC: 32.4 GM/DL (ref 32.2–35.5)
MCV RBC AUTO: 93.8 FL (ref 81–99)
MCV RBC AUTO: 94.5 FL (ref 81–99)
PHOSPHATE SERPL-MCNC: 3.1 MG/DL (ref 2.4–4.7)
PLATELET # BLD AUTO: 196 THOU/MM3 (ref 130–400)
PLATELET # BLD AUTO: 206 THOU/MM3 (ref 130–400)
PMV BLD AUTO: 9.8 FL (ref 9.4–12.4)
PMV BLD AUTO: 9.8 FL (ref 9.4–12.4)
POTASSIUM SERPL-SCNC: 3.8 MEQ/L (ref 3.5–5.2)
RBC # BLD AUTO: 2.35 MILL/MM3 (ref 4.2–5.4)
RBC # BLD AUTO: 2.59 MILL/MM3 (ref 4.2–5.4)
SODIUM SERPL-SCNC: 139 MEQ/L (ref 135–145)
WBC # BLD AUTO: 7.1 THOU/MM3 (ref 4.8–10.8)
WBC # BLD AUTO: 8.7 THOU/MM3 (ref 4.8–10.8)

## 2024-06-17 PROCEDURE — 83735 ASSAY OF MAGNESIUM: CPT

## 2024-06-17 PROCEDURE — 6370000000 HC RX 637 (ALT 250 FOR IP)

## 2024-06-17 PROCEDURE — 2580000003 HC RX 258

## 2024-06-17 PROCEDURE — 70551 MRI BRAIN STEM W/O DYE: CPT

## 2024-06-17 PROCEDURE — 6360000002 HC RX W HCPCS: Performed by: INTERNAL MEDICINE

## 2024-06-17 PROCEDURE — 99232 SBSQ HOSP IP/OBS MODERATE 35: CPT | Performed by: INTERNAL MEDICINE

## 2024-06-17 PROCEDURE — 6360000002 HC RX W HCPCS

## 2024-06-17 PROCEDURE — 93010 ELECTROCARDIOGRAM REPORT: CPT | Performed by: INTERNAL MEDICINE

## 2024-06-17 PROCEDURE — 2060000000 HC ICU INTERMEDIATE R&B

## 2024-06-17 PROCEDURE — 2580000003 HC RX 258: Performed by: INTERNAL MEDICINE

## 2024-06-17 PROCEDURE — 85027 COMPLETE CBC AUTOMATED: CPT

## 2024-06-17 PROCEDURE — 36415 COLL VENOUS BLD VENIPUNCTURE: CPT

## 2024-06-17 PROCEDURE — 94669 MECHANICAL CHEST WALL OSCILL: CPT

## 2024-06-17 PROCEDURE — 97530 THERAPEUTIC ACTIVITIES: CPT

## 2024-06-17 PROCEDURE — 80069 RENAL FUNCTION PANEL: CPT

## 2024-06-17 PROCEDURE — 93005 ELECTROCARDIOGRAM TRACING: CPT

## 2024-06-17 PROCEDURE — 73700 CT LOWER EXTREMITY W/O DYE: CPT

## 2024-06-17 PROCEDURE — 94761 N-INVAS EAR/PLS OXIMETRY MLT: CPT

## 2024-06-17 RX ORDER — NIFEDIPINE 30 MG/1
30 TABLET, EXTENDED RELEASE ORAL DAILY
Status: DISCONTINUED | OUTPATIENT
Start: 2024-06-18 | End: 2024-06-21

## 2024-06-17 RX ORDER — HYDROXYZINE HYDROCHLORIDE 10 MG/1
10 TABLET, FILM COATED ORAL EVERY 6 HOURS PRN
Status: DISCONTINUED | OUTPATIENT
Start: 2024-06-17 | End: 2024-06-19

## 2024-06-17 RX ORDER — HYDRALAZINE HYDROCHLORIDE 20 MG/ML
10 INJECTION INTRAMUSCULAR; INTRAVENOUS EVERY 6 HOURS PRN
Status: DISCONTINUED | OUTPATIENT
Start: 2024-06-17 | End: 2024-06-24 | Stop reason: HOSPADM

## 2024-06-17 RX ORDER — BUSPIRONE HYDROCHLORIDE 5 MG/1
5 TABLET ORAL 3 TIMES DAILY
Status: DISCONTINUED | OUTPATIENT
Start: 2024-06-17 | End: 2024-06-24 | Stop reason: HOSPADM

## 2024-06-17 RX ORDER — HYDROXYZINE HYDROCHLORIDE 25 MG/1
25 TABLET, FILM COATED ORAL ONCE
Status: COMPLETED | OUTPATIENT
Start: 2024-06-17 | End: 2024-06-17

## 2024-06-17 RX ORDER — HYDRALAZINE HYDROCHLORIDE 50 MG/1
100 TABLET, FILM COATED ORAL EVERY 8 HOURS SCHEDULED
Status: DISCONTINUED | OUTPATIENT
Start: 2024-06-17 | End: 2024-06-24 | Stop reason: HOSPADM

## 2024-06-17 RX ORDER — CLONIDINE HYDROCHLORIDE 0.1 MG/1
0.1 TABLET ORAL 3 TIMES DAILY PRN
Status: DISCONTINUED | OUTPATIENT
Start: 2024-06-17 | End: 2024-06-21

## 2024-06-17 RX ORDER — HYDRALAZINE HYDROCHLORIDE 50 MG/1
50 TABLET, FILM COATED ORAL EVERY 8 HOURS SCHEDULED
Status: DISCONTINUED | OUTPATIENT
Start: 2024-06-17 | End: 2024-06-17

## 2024-06-17 RX ORDER — HYDRALAZINE HYDROCHLORIDE 20 MG/ML
INJECTION INTRAMUSCULAR; INTRAVENOUS EVERY 6 HOURS PRN
Status: CANCELLED | OUTPATIENT
Start: 2024-06-17

## 2024-06-17 RX ADMIN — BUSPIRONE HYDROCHLORIDE 5 MG: 5 TABLET ORAL at 14:06

## 2024-06-17 RX ADMIN — SODIUM CHLORIDE 5 MG/HR: 9 INJECTION, SOLUTION INTRAVENOUS at 09:51

## 2024-06-17 RX ADMIN — HYDRALAZINE HYDROCHLORIDE 100 MG: 50 TABLET ORAL at 14:06

## 2024-06-17 RX ADMIN — EZETIMIBE 10 MG: 10 TABLET ORAL at 20:28

## 2024-06-17 RX ADMIN — CARVEDILOL 12.5 MG: 6.25 TABLET, FILM COATED ORAL at 08:45

## 2024-06-17 RX ADMIN — HYDRALAZINE HYDROCHLORIDE 10 MG: 20 INJECTION, SOLUTION INTRAMUSCULAR; INTRAVENOUS at 08:47

## 2024-06-17 RX ADMIN — HYDRALAZINE HYDROCHLORIDE 5 MG: 20 INJECTION INTRAMUSCULAR; INTRAVENOUS at 00:22

## 2024-06-17 RX ADMIN — HYDRALAZINE HYDROCHLORIDE 50 MG: 50 TABLET ORAL at 05:34

## 2024-06-17 RX ADMIN — SODIUM CHLORIDE 5 MG/HR: 9 INJECTION, SOLUTION INTRAVENOUS at 09:27

## 2024-06-17 RX ADMIN — MEMANTINE HYDROCHLORIDE 5 MG: 10 TABLET ORAL at 20:29

## 2024-06-17 RX ADMIN — BUSPIRONE HYDROCHLORIDE 5 MG: 5 TABLET ORAL at 09:27

## 2024-06-17 RX ADMIN — CARVEDILOL 12.5 MG: 6.25 TABLET, FILM COATED ORAL at 16:31

## 2024-06-17 RX ADMIN — MEMANTINE HYDROCHLORIDE 5 MG: 10 TABLET ORAL at 08:45

## 2024-06-17 RX ADMIN — ATORVASTATIN CALCIUM 40 MG: 40 TABLET, FILM COATED ORAL at 20:28

## 2024-06-17 RX ADMIN — ACETAMINOPHEN 650 MG: 325 TABLET ORAL at 03:20

## 2024-06-17 RX ADMIN — ISOSORBIDE MONONITRATE 30 MG: 30 TABLET, EXTENDED RELEASE ORAL at 08:45

## 2024-06-17 RX ADMIN — SODIUM CHLORIDE, PRESERVATIVE FREE 10 ML: 5 INJECTION INTRAVENOUS at 20:30

## 2024-06-17 RX ADMIN — HYDRALAZINE HYDROCHLORIDE 100 MG: 50 TABLET ORAL at 20:29

## 2024-06-17 RX ADMIN — HYDROXYZINE HYDROCHLORIDE 25 MG: 25 TABLET ORAL at 03:21

## 2024-06-17 RX ADMIN — HYDRALAZINE HYDROCHLORIDE 10 MG: 20 INJECTION, SOLUTION INTRAMUSCULAR; INTRAVENOUS at 01:59

## 2024-06-17 RX ADMIN — SERTRALINE 100 MG: 100 TABLET, FILM COATED ORAL at 08:45

## 2024-06-17 ASSESSMENT — PAIN DESCRIPTION - ONSET
ONSET: GRADUAL
ONSET: ON-GOING

## 2024-06-17 ASSESSMENT — PAIN DESCRIPTION - DESCRIPTORS
DESCRIPTORS: ACHING
DESCRIPTORS: ACHING

## 2024-06-17 ASSESSMENT — PAIN DESCRIPTION - ORIENTATION: ORIENTATION: LOWER

## 2024-06-17 ASSESSMENT — PAIN DESCRIPTION - LOCATION
LOCATION: GENERALIZED
LOCATION: BACK

## 2024-06-17 ASSESSMENT — PAIN SCALES - GENERAL
PAINLEVEL_OUTOF10: 7
PAINLEVEL_OUTOF10: 3
PAINLEVEL_OUTOF10: 3
PAINLEVEL_OUTOF10: 8

## 2024-06-17 ASSESSMENT — PAIN - FUNCTIONAL ASSESSMENT
PAIN_FUNCTIONAL_ASSESSMENT: PREVENTS OR INTERFERES SOME ACTIVE ACTIVITIES AND ADLS
PAIN_FUNCTIONAL_ASSESSMENT: PREVENTS OR INTERFERES SOME ACTIVE ACTIVITIES AND ADLS

## 2024-06-17 ASSESSMENT — PAIN DESCRIPTION - FREQUENCY
FREQUENCY: INTERMITTENT
FREQUENCY: INTERMITTENT

## 2024-06-17 ASSESSMENT — PAIN DESCRIPTION - PAIN TYPE
TYPE: CHRONIC PAIN
TYPE: CHRONIC PAIN

## 2024-06-17 NOTE — CARE COORDINATION
6/17/24, 12:26 PM EDT    DISCHARGE ON GOING EVALUATION    [facility] day: 4  [facility] Reason for admit: Anemia [D64.9]     Barriers to Discharge:   From [facility]  Anemia/B/L Nehrolithiasis, B/L Hip Hematomas/CHRISTOPHER/Recurrent Falls/AMS  History: Falls, Dementia, UTI/s  Update:  H 7.9; monitor  Creatinine 2.7 (baseline 1.8-2.2); Urine Output = 450 ml/24h  NPO Status  Await Diet Advancement    PCP: Manav Wilson DO  Readmission Risk Score: 17.3    Patient Goals/Plan/Treatment Preferences: plans return [facility], has WC, walker; brother/POA Adrian

## 2024-06-17 NOTE — PROCEDURES
PROCEDURE NOTE  Date: 6/17/2024   Name: Berenice Fitzgerald  YOB: 1950    Procedures    EKG completed handed to Jolanta GÓMEZ

## 2024-06-17 NOTE — CARE COORDINATION
6/17/24, 2:48 PM EDT  DISCHARGE PLANNING EVALUATION    YVONNE spoke with Shwetha at [facility], she is the director of nursing. She reported that she would like to come see patient today or tomorrow and decide on if they are able to manage her at the [facility] level of care. YVONNE faxed her clinical updates as well. Shwetha will let SW know after she sees patient if they would be able to accept patient back or if patient may need to go skilled at ECF.     YVONNE called brother Adrian and updated him on Shwetha from [facility] coming to see patient. He is in agreement whatever is the best for patient. SW to continue to assess and assist for discharge planning needs.

## 2024-06-18 ENCOUNTER — APPOINTMENT (OUTPATIENT)
Dept: GENERAL RADIOLOGY | Age: 74
DRG: 811 | End: 2024-06-18
Attending: INTERNAL MEDICINE
Payer: MEDICARE

## 2024-06-18 LAB
ANION GAP SERPL CALC-SCNC: 13 MEQ/L (ref 8–16)
BACTERIA: ABNORMAL
BILIRUB UR QL STRIP: NEGATIVE
BUN SERPL-MCNC: 27 MG/DL (ref 7–22)
CALCIUM SERPL-MCNC: 9.2 MG/DL (ref 8.5–10.5)
CASTS #/AREA URNS LPF: ABNORMAL /LPF
CHARACTER UR: ABNORMAL
CHARCOAL URNS QL MICRO: ABNORMAL
CHLORIDE SERPL-SCNC: 106 MEQ/L (ref 98–111)
CO2 SERPL-SCNC: 19 MEQ/L (ref 23–33)
COLOR UR: YELLOW
CREAT SERPL-MCNC: 2.7 MG/DL (ref 0.4–1.2)
CRYSTALS URNS QL MICRO: ABNORMAL
DEPRECATED RDW RBC AUTO: 56.3 FL (ref 35–45)
DEPRECATED RDW RBC AUTO: 57.1 FL (ref 35–45)
EPITHELIAL CELLS, UA: ABNORMAL /HPF
ERYTHROCYTE [DISTWIDTH] IN BLOOD BY AUTOMATED COUNT: 17.4 % (ref 11.5–14.5)
ERYTHROCYTE [DISTWIDTH] IN BLOOD BY AUTOMATED COUNT: 17.5 % (ref 11.5–14.5)
GFR SERPL CREATININE-BSD FRML MDRD: 18 ML/MIN/1.73M2
GLUCOSE SERPL-MCNC: 97 MG/DL (ref 70–108)
GLUCOSE UR QL STRIP.AUTO: 100 MG/DL
HCT VFR BLD AUTO: 24.3 % (ref 37–47)
HCT VFR BLD AUTO: 25.8 % (ref 37–47)
HGB BLD-MCNC: 7.9 GM/DL (ref 12–16)
HGB BLD-MCNC: 8.3 GM/DL (ref 12–16)
HGB UR QL STRIP.AUTO: NEGATIVE
KAPPA/LAMBDA FREE LIGHT CHAINS: NORMAL
KETONES UR QL STRIP.AUTO: NEGATIVE
LEUKOCYTE ESTERASE UR QL STRIP.AUTO: NEGATIVE
MAGNESIUM SERPL-MCNC: 1.9 MG/DL (ref 1.6–2.4)
MCH RBC QN AUTO: 30.1 PG (ref 26–33)
MCH RBC QN AUTO: 30.5 PG (ref 26–33)
MCHC RBC AUTO-ENTMCNC: 32.2 GM/DL (ref 32.2–35.5)
MCHC RBC AUTO-ENTMCNC: 32.5 GM/DL (ref 32.2–35.5)
MCV RBC AUTO: 93.5 FL (ref 81–99)
MCV RBC AUTO: 93.8 FL (ref 81–99)
NITRITE UR QL STRIP.AUTO: POSITIVE
PH UR STRIP.AUTO: 6 [PH] (ref 5–9)
PLATELET # BLD AUTO: 220 THOU/MM3 (ref 130–400)
PLATELET # BLD AUTO: 245 THOU/MM3 (ref 130–400)
PMV BLD AUTO: 9.8 FL (ref 9.4–12.4)
PMV BLD AUTO: 9.8 FL (ref 9.4–12.4)
POTASSIUM SERPL-SCNC: 3.4 MEQ/L (ref 3.5–5.2)
POTASSIUM SERPL-SCNC: 4.1 MEQ/L (ref 3.5–5.2)
PROT UR STRIP.AUTO-MCNC: >= 300 MG/DL
RBC # BLD AUTO: 2.59 MILL/MM3 (ref 4.2–5.4)
RBC # BLD AUTO: 2.76 MILL/MM3 (ref 4.2–5.4)
RBC #/AREA URNS HPF: ABNORMAL /HPF
RENAL EPI CELLS #/AREA URNS HPF: ABNORMAL /[HPF]
SODIUM SERPL-SCNC: 138 MEQ/L (ref 135–145)
SPECIFIC GRAVITY UA: > 1.03 (ref 1–1.03)
UROBILINOGEN, URINE: 1 EU/DL (ref 0–1)
WBC # BLD AUTO: 8.1 THOU/MM3 (ref 4.8–10.8)
WBC # BLD AUTO: 8.4 THOU/MM3 (ref 4.8–10.8)
WBC #/AREA URNS HPF: ABNORMAL /HPF
YEAST LIKE FUNGI URNS QL MICRO: ABNORMAL

## 2024-06-18 PROCEDURE — 80048 BASIC METABOLIC PNL TOTAL CA: CPT

## 2024-06-18 PROCEDURE — 81001 URINALYSIS AUTO W/SCOPE: CPT

## 2024-06-18 PROCEDURE — 85027 COMPLETE CBC AUTOMATED: CPT

## 2024-06-18 PROCEDURE — 71045 X-RAY EXAM CHEST 1 VIEW: CPT

## 2024-06-18 PROCEDURE — 6370000000 HC RX 637 (ALT 250 FOR IP): Performed by: INTERNAL MEDICINE

## 2024-06-18 PROCEDURE — 36415 COLL VENOUS BLD VENIPUNCTURE: CPT

## 2024-06-18 PROCEDURE — 97129 THER IVNTJ 1ST 15 MIN: CPT

## 2024-06-18 PROCEDURE — 6370000000 HC RX 637 (ALT 250 FOR IP)

## 2024-06-18 PROCEDURE — 2060000000 HC ICU INTERMEDIATE R&B

## 2024-06-18 PROCEDURE — 97530 THERAPEUTIC ACTIVITIES: CPT

## 2024-06-18 PROCEDURE — 99232 SBSQ HOSP IP/OBS MODERATE 35: CPT | Performed by: INTERNAL MEDICINE

## 2024-06-18 PROCEDURE — 83735 ASSAY OF MAGNESIUM: CPT

## 2024-06-18 PROCEDURE — 2580000003 HC RX 258

## 2024-06-18 PROCEDURE — 94669 MECHANICAL CHEST WALL OSCILL: CPT

## 2024-06-18 PROCEDURE — 84132 ASSAY OF SERUM POTASSIUM: CPT

## 2024-06-18 RX ADMIN — HYDRALAZINE HYDROCHLORIDE 100 MG: 50 TABLET ORAL at 05:04

## 2024-06-18 RX ADMIN — ACETAMINOPHEN 650 MG: 325 TABLET ORAL at 20:35

## 2024-06-18 RX ADMIN — CARVEDILOL 12.5 MG: 6.25 TABLET, FILM COATED ORAL at 16:42

## 2024-06-18 RX ADMIN — POTASSIUM BICARBONATE 40 MEQ: 782 TABLET, EFFERVESCENT ORAL at 08:46

## 2024-06-18 RX ADMIN — HYDRALAZINE HYDROCHLORIDE 100 MG: 50 TABLET ORAL at 14:10

## 2024-06-18 RX ADMIN — NIFEDIPINE 30 MG: 30 TABLET, FILM COATED, EXTENDED RELEASE ORAL at 08:44

## 2024-06-18 RX ADMIN — ISOSORBIDE MONONITRATE 30 MG: 30 TABLET, EXTENDED RELEASE ORAL at 08:44

## 2024-06-18 RX ADMIN — SODIUM CHLORIDE, PRESERVATIVE FREE 10 ML: 5 INJECTION INTRAVENOUS at 08:45

## 2024-06-18 RX ADMIN — SODIUM CHLORIDE, PRESERVATIVE FREE 10 ML: 5 INJECTION INTRAVENOUS at 20:35

## 2024-06-18 RX ADMIN — SERTRALINE 100 MG: 100 TABLET, FILM COATED ORAL at 08:44

## 2024-06-18 RX ADMIN — MEMANTINE HYDROCHLORIDE 5 MG: 10 TABLET ORAL at 08:43

## 2024-06-18 RX ADMIN — EZETIMIBE 10 MG: 10 TABLET ORAL at 20:35

## 2024-06-18 RX ADMIN — MEMANTINE HYDROCHLORIDE 5 MG: 10 TABLET ORAL at 20:35

## 2024-06-18 RX ADMIN — CARVEDILOL 12.5 MG: 6.25 TABLET, FILM COATED ORAL at 08:43

## 2024-06-18 RX ADMIN — ATORVASTATIN CALCIUM 40 MG: 40 TABLET, FILM COATED ORAL at 20:35

## 2024-06-18 ASSESSMENT — PAIN DESCRIPTION - ORIENTATION: ORIENTATION: MID

## 2024-06-18 ASSESSMENT — PAIN DESCRIPTION - DESCRIPTORS: DESCRIPTORS: ACHING;DISCOMFORT

## 2024-06-18 ASSESSMENT — PAIN DESCRIPTION - LOCATION: LOCATION: BACK

## 2024-06-18 ASSESSMENT — PAIN SCALES - GENERAL: PAINLEVEL_OUTOF10: 5

## 2024-06-18 NOTE — CARE COORDINATION
6/18/24, 10:42 AM EDT  DISCHARGE PLANNING EVALUATION    YVONNE spoke with Shwetha at [facility] and she reported that she will come tomorrow and on-site patient and let SW know if they will be able to meet her needs there or if she will need to go skilled.         6/18/24, 1:46 PM EDT  DISCHARGE PLANNING EVALUATION    YVONNE spoke with patient's brother Adrian and he reported that he has been working with Shwetha at [facility] and they feel patient would benefit from skilled stay. He would like referral to [facility] and [facility].     YVONNE called Mar at [facility] and left a voicemail asking for a call back.

## 2024-06-19 LAB
ANION GAP SERPL CALC-SCNC: 10 MEQ/L (ref 8–16)
BACTERIA: ABNORMAL
BILIRUB UR QL STRIP: NEGATIVE
BUN SERPL-MCNC: 28 MG/DL (ref 7–22)
CALCIUM SERPL-MCNC: 9.2 MG/DL (ref 8.5–10.5)
CASTS #/AREA URNS LPF: ABNORMAL /LPF
CASTS #/AREA URNS LPF: ABNORMAL /LPF
CHARACTER UR: CLEAR
CHARCOAL URNS QL MICRO: ABNORMAL
CHLORIDE 24H UR-SRATE: 101 MEQ/L
CHLORIDE SERPL-SCNC: 102 MEQ/L (ref 98–111)
CO2 SERPL-SCNC: 24 MEQ/L (ref 23–33)
COLOR UR: YELLOW
CREAT SERPL-MCNC: 2.4 MG/DL (ref 0.4–1.2)
CREAT UR-MCNC: 56.5 MG/DL
CRYSTALS URNS QL MICRO: ABNORMAL
DEPRECATED RDW RBC AUTO: 58.3 FL (ref 35–45)
EKG ATRIAL RATE: 73 BPM
EKG P AXIS: 17 DEGREES
EKG P-R INTERVAL: 194 MS
EKG Q-T INTERVAL: 416 MS
EKG QRS DURATION: 106 MS
EKG QTC CALCULATION (BAZETT): 458 MS
EKG R AXIS: 7 DEGREES
EKG T AXIS: 134 DEGREES
EKG VENTRICULAR RATE: 73 BPM
EPITHELIAL CELLS, UA: ABNORMAL /HPF
ERYTHROCYTE [DISTWIDTH] IN BLOOD BY AUTOMATED COUNT: 17.2 % (ref 11.5–14.5)
GFR SERPL CREATININE-BSD FRML MDRD: 21 ML/MIN/1.73M2
GLUCOSE SERPL-MCNC: 138 MG/DL (ref 70–108)
GLUCOSE UR QL STRIP.AUTO: NEGATIVE MG/DL
HCT VFR BLD AUTO: 26.4 % (ref 37–47)
HGB BLD-MCNC: 8.2 GM/DL (ref 12–16)
HGB UR QL STRIP.AUTO: NEGATIVE
KETONES UR QL STRIP.AUTO: NEGATIVE
LEUKOCYTE ESTERASE UR QL STRIP.AUTO: NEGATIVE
MAGNESIUM SERPL-MCNC: 1.9 MG/DL (ref 1.6–2.4)
MCH RBC QN AUTO: 29.8 PG (ref 26–33)
MCHC RBC AUTO-ENTMCNC: 31.1 GM/DL (ref 32.2–35.5)
MCV RBC AUTO: 96 FL (ref 81–99)
NITRITE UR QL STRIP.AUTO: NEGATIVE
PH UR STRIP.AUTO: 7 [PH] (ref 5–9)
PLATELET # BLD AUTO: 235 THOU/MM3 (ref 130–400)
PMV BLD AUTO: 9.3 FL (ref 9.4–12.4)
POTASSIUM SERPL-SCNC: 3.2 MEQ/L (ref 3.5–5.2)
POTASSIUM SERPL-SCNC: 3.8 MEQ/L (ref 3.5–5.2)
POTASSIUM UR-SCNC: 31.8 MEQ/L
PROT UR STRIP.AUTO-MCNC: 300 MG/DL
PROT UR-MCNC: 264.3 MG/DL
PROT/CREAT 24H UR: 4.68 MG/G{CREAT}
PROTEIN ELECTROPHORESIS, SERUM: NORMAL
RBC # BLD AUTO: 2.75 MILL/MM3 (ref 4.2–5.4)
RBC #/AREA URNS HPF: ABNORMAL /HPF
RENAL EPI CELLS #/AREA URNS HPF: ABNORMAL /[HPF]
SODIUM SERPL-SCNC: 136 MEQ/L (ref 135–145)
SODIUM UR-SCNC: 102 MEQ/L
SPECIFIC GRAVITY UA: 1.01 (ref 1–1.03)
TROPONIN, HIGH SENSITIVITY: 41 NG/L (ref 0–12)
TROPONIN, HIGH SENSITIVITY: 41 NG/L (ref 0–12)
TROPONIN, HIGH SENSITIVITY: 42 NG/L (ref 0–12)
UROBILINOGEN, URINE: 0.2 EU/DL (ref 0–1)
UUN 24H UR-MCNC: 352 MG/DL
WBC # BLD AUTO: 7.9 THOU/MM3 (ref 4.8–10.8)
WBC #/AREA URNS HPF: ABNORMAL /HPF
YEAST LIKE FUNGI URNS QL MICRO: ABNORMAL

## 2024-06-19 PROCEDURE — 94669 MECHANICAL CHEST WALL OSCILL: CPT

## 2024-06-19 PROCEDURE — 84300 ASSAY OF URINE SODIUM: CPT

## 2024-06-19 PROCEDURE — 6370000000 HC RX 637 (ALT 250 FOR IP): Performed by: INTERNAL MEDICINE

## 2024-06-19 PROCEDURE — 81001 URINALYSIS AUTO W/SCOPE: CPT

## 2024-06-19 PROCEDURE — 6360000002 HC RX W HCPCS

## 2024-06-19 PROCEDURE — 83735 ASSAY OF MAGNESIUM: CPT

## 2024-06-19 PROCEDURE — 85027 COMPLETE CBC AUTOMATED: CPT

## 2024-06-19 PROCEDURE — 84132 ASSAY OF SERUM POTASSIUM: CPT

## 2024-06-19 PROCEDURE — 6370000000 HC RX 637 (ALT 250 FOR IP)

## 2024-06-19 PROCEDURE — 99232 SBSQ HOSP IP/OBS MODERATE 35: CPT | Performed by: INTERNAL MEDICINE

## 2024-06-19 PROCEDURE — 84484 ASSAY OF TROPONIN QUANT: CPT

## 2024-06-19 PROCEDURE — 93005 ELECTROCARDIOGRAM TRACING: CPT

## 2024-06-19 PROCEDURE — 82570 ASSAY OF URINE CREATININE: CPT

## 2024-06-19 PROCEDURE — 2060000000 HC ICU INTERMEDIATE R&B

## 2024-06-19 PROCEDURE — 82436 ASSAY OF URINE CHLORIDE: CPT

## 2024-06-19 PROCEDURE — 2580000003 HC RX 258

## 2024-06-19 PROCEDURE — 84156 ASSAY OF PROTEIN URINE: CPT

## 2024-06-19 PROCEDURE — 36415 COLL VENOUS BLD VENIPUNCTURE: CPT

## 2024-06-19 PROCEDURE — 80048 BASIC METABOLIC PNL TOTAL CA: CPT

## 2024-06-19 PROCEDURE — 93010 ELECTROCARDIOGRAM REPORT: CPT | Performed by: INTERNAL MEDICINE

## 2024-06-19 PROCEDURE — 84540 ASSAY OF URINE/UREA-N: CPT

## 2024-06-19 PROCEDURE — 84133 ASSAY OF URINE POTASSIUM: CPT

## 2024-06-19 RX ORDER — POTASSIUM CHLORIDE 7.45 MG/ML
10 INJECTION INTRAVENOUS
Status: COMPLETED | OUTPATIENT
Start: 2024-06-19 | End: 2024-06-19

## 2024-06-19 RX ADMIN — POTASSIUM CHLORIDE 10 MEQ: 7.46 INJECTION, SOLUTION INTRAVENOUS at 14:29

## 2024-06-19 RX ADMIN — HYDRALAZINE HYDROCHLORIDE 100 MG: 50 TABLET ORAL at 21:07

## 2024-06-19 RX ADMIN — POTASSIUM CHLORIDE 10 MEQ: 7.46 INJECTION, SOLUTION INTRAVENOUS at 19:56

## 2024-06-19 RX ADMIN — HYDRALAZINE HYDROCHLORIDE 100 MG: 50 TABLET ORAL at 06:22

## 2024-06-19 RX ADMIN — CARVEDILOL 12.5 MG: 6.25 TABLET, FILM COATED ORAL at 16:34

## 2024-06-19 RX ADMIN — SERTRALINE 100 MG: 100 TABLET, FILM COATED ORAL at 10:00

## 2024-06-19 RX ADMIN — ATORVASTATIN CALCIUM 40 MG: 40 TABLET, FILM COATED ORAL at 21:07

## 2024-06-19 RX ADMIN — HYDRALAZINE HYDROCHLORIDE 100 MG: 50 TABLET ORAL at 13:47

## 2024-06-19 RX ADMIN — HYDRALAZINE HYDROCHLORIDE 100 MG: 50 TABLET ORAL at 01:20

## 2024-06-19 RX ADMIN — MEMANTINE HYDROCHLORIDE 5 MG: 10 TABLET ORAL at 10:00

## 2024-06-19 RX ADMIN — POTASSIUM BICARBONATE 20 MEQ: 782 TABLET, EFFERVESCENT ORAL at 21:08

## 2024-06-19 RX ADMIN — ISOSORBIDE MONONITRATE 30 MG: 30 TABLET, EXTENDED RELEASE ORAL at 10:00

## 2024-06-19 RX ADMIN — CARVEDILOL 12.5 MG: 6.25 TABLET, FILM COATED ORAL at 10:00

## 2024-06-19 RX ADMIN — EZETIMIBE 10 MG: 10 TABLET ORAL at 21:07

## 2024-06-19 RX ADMIN — POTASSIUM CHLORIDE 10 MEQ: 7.46 INJECTION, SOLUTION INTRAVENOUS at 18:51

## 2024-06-19 RX ADMIN — SODIUM CHLORIDE, PRESERVATIVE FREE 10 ML: 5 INJECTION INTRAVENOUS at 10:00

## 2024-06-19 RX ADMIN — LIDOCAINE HYDROCHLORIDE: 20 SOLUTION ORAL at 13:45

## 2024-06-19 RX ADMIN — NIFEDIPINE 30 MG: 30 TABLET, FILM COATED, EXTENDED RELEASE ORAL at 10:00

## 2024-06-19 RX ADMIN — MEMANTINE HYDROCHLORIDE 5 MG: 10 TABLET ORAL at 21:08

## 2024-06-19 RX ADMIN — POTASSIUM CHLORIDE 10 MEQ: 7.46 INJECTION, SOLUTION INTRAVENOUS at 15:28

## 2024-06-19 RX ADMIN — POTASSIUM CHLORIDE 10 MEQ: 7.46 INJECTION, SOLUTION INTRAVENOUS at 17:35

## 2024-06-19 RX ADMIN — POTASSIUM CHLORIDE 10 MEQ: 7.46 INJECTION, SOLUTION INTRAVENOUS at 16:31

## 2024-06-19 RX ADMIN — ACETAMINOPHEN 650 MG: 325 TABLET ORAL at 17:35

## 2024-06-19 ASSESSMENT — PAIN DESCRIPTION - DESCRIPTORS: DESCRIPTORS: ACHING

## 2024-06-19 ASSESSMENT — PAIN SCALES - GENERAL
PAINLEVEL_OUTOF10: 3
PAINLEVEL_OUTOF10: 0

## 2024-06-19 ASSESSMENT — PAIN - FUNCTIONAL ASSESSMENT: PAIN_FUNCTIONAL_ASSESSMENT: ACTIVITIES ARE NOT PREVENTED

## 2024-06-19 ASSESSMENT — PAIN DESCRIPTION - LOCATION: LOCATION: HEAD

## 2024-06-19 ASSESSMENT — PAIN DESCRIPTION - ORIENTATION: ORIENTATION: POSTERIOR

## 2024-06-19 NOTE — CARE COORDINATION
6/19/24, 2:20 PM EDT    DISCHARGE PLANNING EVALUATION       Spoke with Mar at [facility] and no bed available until next week.  [facility] choice is [facility].  Called and made a referral and faxed packet.  Updated patient and family.  No further needs voiced.

## 2024-06-19 NOTE — CARE COORDINATION
6/19/24, 11:20 AM EDT    DISCHARGE PLANNING EVALUATION       Referral made to [facility].  Packet faxed.

## 2024-06-19 NOTE — PROCEDURES
PROCEDURE NOTE  Date: 6/19/2024   Name: Berenice Fitzgerald  YOB: 1950    Procedures EKG completed, given to RN

## 2024-06-20 LAB
ANION GAP SERPL CALC-SCNC: 12 MEQ/L (ref 8–16)
BUN SERPL-MCNC: 26 MG/DL (ref 7–22)
CALCIUM SERPL-MCNC: 9.3 MG/DL (ref 8.5–10.5)
CHLORIDE SERPL-SCNC: 105 MEQ/L (ref 98–111)
CO2 SERPL-SCNC: 21 MEQ/L (ref 23–33)
CORTIS SERPL-MCNC: 15.04 UG/DL
CORTISOL COLLECTION INFO: NORMAL
CREAT SERPL-MCNC: 2.4 MG/DL (ref 0.4–1.2)
DEPRECATED RDW RBC AUTO: 59.6 FL (ref 35–45)
ERYTHROCYTE [DISTWIDTH] IN BLOOD BY AUTOMATED COUNT: 17.5 % (ref 11.5–14.5)
GFR SERPL CREATININE-BSD FRML MDRD: 21 ML/MIN/1.73M2
GLUCOSE SERPL-MCNC: 89 MG/DL (ref 70–108)
HCT VFR BLD AUTO: 25.9 % (ref 37–47)
HGB BLD-MCNC: 8 GM/DL (ref 12–16)
MAGNESIUM SERPL-MCNC: 1.8 MG/DL (ref 1.6–2.4)
MCH RBC QN AUTO: 29.7 PG (ref 26–33)
MCHC RBC AUTO-ENTMCNC: 30.9 GM/DL (ref 32.2–35.5)
MCV RBC AUTO: 96.3 FL (ref 81–99)
PLATELET # BLD AUTO: 266 THOU/MM3 (ref 130–400)
PMV BLD AUTO: 9.7 FL (ref 9.4–12.4)
POTASSIUM SERPL-SCNC: 3.8 MEQ/L (ref 3.5–5.2)
RBC # BLD AUTO: 2.69 MILL/MM3 (ref 4.2–5.4)
SODIUM SERPL-SCNC: 138 MEQ/L (ref 135–145)
WBC # BLD AUTO: 7.6 THOU/MM3 (ref 4.8–10.8)

## 2024-06-20 PROCEDURE — 99232 SBSQ HOSP IP/OBS MODERATE 35: CPT | Performed by: INTERNAL MEDICINE

## 2024-06-20 PROCEDURE — 6370000000 HC RX 637 (ALT 250 FOR IP)

## 2024-06-20 PROCEDURE — 94669 MECHANICAL CHEST WALL OSCILL: CPT

## 2024-06-20 PROCEDURE — 36415 COLL VENOUS BLD VENIPUNCTURE: CPT

## 2024-06-20 PROCEDURE — 85027 COMPLETE CBC AUTOMATED: CPT

## 2024-06-20 PROCEDURE — 80048 BASIC METABOLIC PNL TOTAL CA: CPT

## 2024-06-20 PROCEDURE — 6370000000 HC RX 637 (ALT 250 FOR IP): Performed by: INTERNAL MEDICINE

## 2024-06-20 PROCEDURE — 82533 TOTAL CORTISOL: CPT

## 2024-06-20 PROCEDURE — 6360000002 HC RX W HCPCS

## 2024-06-20 PROCEDURE — 83735 ASSAY OF MAGNESIUM: CPT

## 2024-06-20 PROCEDURE — 1200000003 HC TELEMETRY R&B

## 2024-06-20 PROCEDURE — 2580000003 HC RX 258

## 2024-06-20 RX ORDER — HYDRALAZINE HYDROCHLORIDE 20 MG/ML
5 INJECTION INTRAMUSCULAR; INTRAVENOUS ONCE
Status: COMPLETED | OUTPATIENT
Start: 2024-06-20 | End: 2024-06-20

## 2024-06-20 RX ORDER — PANTOPRAZOLE SODIUM 40 MG/1
40 TABLET, DELAYED RELEASE ORAL
Status: DISCONTINUED | OUTPATIENT
Start: 2024-06-21 | End: 2024-06-24 | Stop reason: HOSPADM

## 2024-06-20 RX ADMIN — ISOSORBIDE MONONITRATE 30 MG: 30 TABLET, EXTENDED RELEASE ORAL at 08:04

## 2024-06-20 RX ADMIN — ACETAMINOPHEN 650 MG: 325 TABLET ORAL at 08:05

## 2024-06-20 RX ADMIN — SODIUM CHLORIDE, PRESERVATIVE FREE 10 ML: 5 INJECTION INTRAVENOUS at 21:03

## 2024-06-20 RX ADMIN — CARVEDILOL 12.5 MG: 6.25 TABLET, FILM COATED ORAL at 08:04

## 2024-06-20 RX ADMIN — HYDRALAZINE HYDROCHLORIDE 100 MG: 50 TABLET ORAL at 15:05

## 2024-06-20 RX ADMIN — SERTRALINE 100 MG: 100 TABLET, FILM COATED ORAL at 08:04

## 2024-06-20 RX ADMIN — CARVEDILOL 12.5 MG: 6.25 TABLET, FILM COATED ORAL at 17:50

## 2024-06-20 RX ADMIN — MEMANTINE HYDROCHLORIDE 5 MG: 10 TABLET ORAL at 21:01

## 2024-06-20 RX ADMIN — NIFEDIPINE 30 MG: 30 TABLET, FILM COATED, EXTENDED RELEASE ORAL at 08:04

## 2024-06-20 RX ADMIN — HYDRALAZINE HYDROCHLORIDE 100 MG: 50 TABLET ORAL at 21:01

## 2024-06-20 RX ADMIN — POTASSIUM BICARBONATE 20 MEQ: 782 TABLET, EFFERVESCENT ORAL at 08:04

## 2024-06-20 RX ADMIN — ATORVASTATIN CALCIUM 40 MG: 40 TABLET, FILM COATED ORAL at 21:01

## 2024-06-20 RX ADMIN — MEMANTINE HYDROCHLORIDE 5 MG: 10 TABLET ORAL at 08:04

## 2024-06-20 RX ADMIN — HYDRALAZINE HYDROCHLORIDE 5 MG: 20 INJECTION, SOLUTION INTRAMUSCULAR; INTRAVENOUS at 22:50

## 2024-06-20 RX ADMIN — HYDRALAZINE HYDROCHLORIDE 100 MG: 50 TABLET ORAL at 05:55

## 2024-06-20 RX ADMIN — SODIUM CHLORIDE, PRESERVATIVE FREE 10 ML: 5 INJECTION INTRAVENOUS at 08:04

## 2024-06-20 RX ADMIN — EZETIMIBE 10 MG: 10 TABLET ORAL at 21:01

## 2024-06-20 ASSESSMENT — PAIN DESCRIPTION - ORIENTATION: ORIENTATION: MID

## 2024-06-20 ASSESSMENT — PAIN DESCRIPTION - ONSET: ONSET: ON-GOING

## 2024-06-20 ASSESSMENT — PAIN - FUNCTIONAL ASSESSMENT: PAIN_FUNCTIONAL_ASSESSMENT: ACTIVITIES ARE NOT PREVENTED

## 2024-06-20 ASSESSMENT — PAIN DESCRIPTION - PAIN TYPE: TYPE: ACUTE PAIN

## 2024-06-20 ASSESSMENT — PAIN DESCRIPTION - FREQUENCY: FREQUENCY: INTERMITTENT

## 2024-06-20 ASSESSMENT — PAIN SCALES - GENERAL
PAINLEVEL_OUTOF10: 0
PAINLEVEL_OUTOF10: 7

## 2024-06-20 ASSESSMENT — PAIN DESCRIPTION - DESCRIPTORS: DESCRIPTORS: ACHING

## 2024-06-20 ASSESSMENT — PAIN DESCRIPTION - LOCATION: LOCATION: ABDOMEN

## 2024-06-20 NOTE — CARE COORDINATION
6/20/24, 11:51 AM EDT  DISCHARGE PLANNING EVALUATION    YVONNE spoke with Amanda at [facility] and she asked if patient was planning on returning to [facility] at discharge. SW reported that the goal is to get back to [facility]. She reported that they are able to accept patient and to let her know when precert needs started.     YVONNE spoke with patient, brother Adrian and sister in law Aminata about discharge planning. SW notified them that [facility] accepted and precert will be started when medically ready. Answered general questions about discharge process to ECF. Denied further needs.

## 2024-06-20 NOTE — DISCHARGE INSTR - COC
Continuity of Care Form    Patient Name: Berenice Fitzgerald   :  1950  MRN:  [id number]    Admit date:  2024  Discharge date:  2024    Code Status Order: Limited   Advance Directives:     Admitting Physician:  Ricki Stubbs MD  PCP: Manav Wilson DO    Discharging Nurse: Nancy Ch  Discharging Hospital Unit/Room#: 4K-13/013-A  Discharging Unit Phone Number: [phone]    Emergency Contact:   Extended Emergency Contact Information  Primary Emergency Contact: Adrian Acosta  [place] Phone: [phone]  Relation: Brother/Sister   needed? No  Secondary Emergency Contact: Aminata Pickard  [place] Phone: [phone]  Relation: Other Relative    Past Surgical History:  No past surgical history on file.    Immunization History:     There is no immunization history on file for this patient.    Active Problems:  Patient Active Problem List   Diagnosis Code    Normocytic anemia due to blood loss D50.0    Hematoma T14.8XXA    Chronic congestive heart failure (HCC) I50.9    Stage 3b chronic kidney disease (HCC) N18.32       Isolation/Infection:   Isolation            No Isolation          Patient Infection Status       None to display            Nurse Assessment:  Last Vital Signs: /66   Pulse 62   Temp 98.1 °F (36.7 °C) (Oral)   Resp 18   Ht 1.676 m (5' 6\")   Wt 81.4 kg (179 lb 7.3 oz)   SpO2 97%   BMI 28.96 kg/m²     Last documented pain score (0-10 scale): Pain Level: 0  Last Weight:   Wt Readings from Last 1 Encounters:   24 81.4 kg (179 lb 7.3 oz)     Mental Status:  disoriented    IV Access:  - None    Nursing Mobility/ADLs:  Walking   Assisted  Transfer  Assisted  Bathing  Assisted  Dressing  Assisted  Toileting  Assisted  Feeding  Independent  Med Admin  Assisted  Med Delivery   whole    Wound Care Documentation and Therapy:        Elimination:  Continence:   Bowel: Yes  Bladder: Yes  Urinary Catheter: None   Colostomy/Ileostomy/Ileal Conduit: No       Date

## 2024-06-20 NOTE — CARE COORDINATION
6/20/24, 12:08 PM EDT    DISCHARGE ON GOING EVALUATION    [facility] day: 7  Location: [facility]/013 Reason for admit: Anemia [D64.9]     Barriers to Discharge:   From [facility]  Anemia/B/L Nehrolithiasis, B/L Hip Hematomas/CHRISTOPHER/Recurrent Falls/AMS/CHRISTOPHER  History: Falls, Dementia, UTI/s  H 8.0; monitor  Creatinine 2.4 (baseline 1.8-2.2); Urine Output = 1025 ml/24h  PCP: Manav Wilson DO  Readmission Risk Score: 17.3  Patient Goals/Plan/Treatment Preferences: from [facility], has WC, walker; brother/HOWARD Campbell, plans [facility] (precert; therapy following)

## 2024-06-21 ENCOUNTER — APPOINTMENT (OUTPATIENT)
Dept: ULTRASOUND IMAGING | Age: 74
DRG: 811 | End: 2024-06-21
Attending: INTERNAL MEDICINE
Payer: MEDICARE

## 2024-06-21 LAB
ANION GAP SERPL CALC-SCNC: 11 MEQ/L (ref 8–16)
BUN SERPL-MCNC: 26 MG/DL (ref 7–22)
CALCIUM SERPL-MCNC: 9.2 MG/DL (ref 8.5–10.5)
CHLORIDE SERPL-SCNC: 104 MEQ/L (ref 98–111)
CO2 SERPL-SCNC: 23 MEQ/L (ref 23–33)
CREAT SERPL-MCNC: 2.3 MG/DL (ref 0.4–1.2)
DEPRECATED RDW RBC AUTO: 59.5 FL (ref 35–45)
EKG ATRIAL RATE: 68 BPM
EKG P AXIS: 5 DEGREES
EKG P-R INTERVAL: 206 MS
EKG Q-T INTERVAL: 434 MS
EKG QRS DURATION: 110 MS
EKG QTC CALCULATION (BAZETT): 461 MS
EKG R AXIS: -31 DEGREES
EKG T AXIS: 120 DEGREES
EKG VENTRICULAR RATE: 68 BPM
ERYTHROCYTE [DISTWIDTH] IN BLOOD BY AUTOMATED COUNT: 17.2 % (ref 11.5–14.5)
GFR SERPL CREATININE-BSD FRML MDRD: 22 ML/MIN/1.73M2
GLUCOSE SERPL-MCNC: 93 MG/DL (ref 70–108)
HCT VFR BLD AUTO: 25.6 % (ref 37–47)
HGB BLD-MCNC: 8.1 GM/DL (ref 12–16)
MAGNESIUM SERPL-MCNC: 1.8 MG/DL (ref 1.6–2.4)
MCH RBC QN AUTO: 30.2 PG (ref 26–33)
MCHC RBC AUTO-ENTMCNC: 31.6 GM/DL (ref 32.2–35.5)
MCV RBC AUTO: 95.5 FL (ref 81–99)
PLATELET # BLD AUTO: 262 THOU/MM3 (ref 130–400)
PMV BLD AUTO: 9.1 FL (ref 9.4–12.4)
POTASSIUM SERPL-SCNC: 3.4 MEQ/L (ref 3.5–5.2)
RBC # BLD AUTO: 2.68 MILL/MM3 (ref 4.2–5.4)
SODIUM SERPL-SCNC: 138 MEQ/L (ref 135–145)
WBC # BLD AUTO: 7.3 THOU/MM3 (ref 4.8–10.8)

## 2024-06-21 PROCEDURE — 83835 ASSAY OF METANEPHRINES: CPT

## 2024-06-21 PROCEDURE — 6370000000 HC RX 637 (ALT 250 FOR IP): Performed by: INTERNAL MEDICINE

## 2024-06-21 PROCEDURE — 36415 COLL VENOUS BLD VENIPUNCTURE: CPT

## 2024-06-21 PROCEDURE — 93975 VASCULAR STUDY: CPT

## 2024-06-21 PROCEDURE — 99232 SBSQ HOSP IP/OBS MODERATE 35: CPT | Performed by: INTERNAL MEDICINE

## 2024-06-21 PROCEDURE — 97166 OT EVAL MOD COMPLEX 45 MIN: CPT

## 2024-06-21 PROCEDURE — 6370000000 HC RX 637 (ALT 250 FOR IP)

## 2024-06-21 PROCEDURE — 80048 BASIC METABOLIC PNL TOTAL CA: CPT

## 2024-06-21 PROCEDURE — 87086 URINE CULTURE/COLONY COUNT: CPT

## 2024-06-21 PROCEDURE — 93005 ELECTROCARDIOGRAM TRACING: CPT

## 2024-06-21 PROCEDURE — 1200000003 HC TELEMETRY R&B

## 2024-06-21 PROCEDURE — 94669 MECHANICAL CHEST WALL OSCILL: CPT

## 2024-06-21 PROCEDURE — 85027 COMPLETE CBC AUTOMATED: CPT

## 2024-06-21 PROCEDURE — 97116 GAIT TRAINING THERAPY: CPT

## 2024-06-21 PROCEDURE — 83735 ASSAY OF MAGNESIUM: CPT

## 2024-06-21 PROCEDURE — 97110 THERAPEUTIC EXERCISES: CPT

## 2024-06-21 PROCEDURE — 93010 ELECTROCARDIOGRAM REPORT: CPT | Performed by: INTERNAL MEDICINE

## 2024-06-21 PROCEDURE — 6360000002 HC RX W HCPCS

## 2024-06-21 PROCEDURE — 2580000003 HC RX 258

## 2024-06-21 PROCEDURE — 97535 SELF CARE MNGMENT TRAINING: CPT

## 2024-06-21 RX ORDER — HYDRALAZINE HYDROCHLORIDE 20 MG/ML
5 INJECTION INTRAMUSCULAR; INTRAVENOUS ONCE
Status: COMPLETED | OUTPATIENT
Start: 2024-06-21 | End: 2024-06-21

## 2024-06-21 RX ORDER — NIFEDIPINE 30 MG/1
60 TABLET, EXTENDED RELEASE ORAL DAILY
Status: DISCONTINUED | OUTPATIENT
Start: 2024-06-22 | End: 2024-06-24 | Stop reason: HOSPADM

## 2024-06-21 RX ORDER — NIFEDIPINE 30 MG/1
30 TABLET, EXTENDED RELEASE ORAL ONCE
Status: COMPLETED | OUTPATIENT
Start: 2024-06-21 | End: 2024-06-21

## 2024-06-21 RX ORDER — POTASSIUM CHLORIDE 20 MEQ/1
20 TABLET, EXTENDED RELEASE ORAL 2 TIMES DAILY WITH MEALS
Status: DISCONTINUED | OUTPATIENT
Start: 2024-06-21 | End: 2024-06-24 | Stop reason: HOSPADM

## 2024-06-21 RX ORDER — CLONIDINE HYDROCHLORIDE 0.1 MG/1
0.1 TABLET ORAL 3 TIMES DAILY
Status: DISCONTINUED | OUTPATIENT
Start: 2024-06-21 | End: 2024-06-24 | Stop reason: HOSPADM

## 2024-06-21 RX ADMIN — EZETIMIBE 10 MG: 10 TABLET ORAL at 21:13

## 2024-06-21 RX ADMIN — POTASSIUM CHLORIDE 20 MEQ: 1500 TABLET, EXTENDED RELEASE ORAL at 16:26

## 2024-06-21 RX ADMIN — SERTRALINE 100 MG: 100 TABLET, FILM COATED ORAL at 08:25

## 2024-06-21 RX ADMIN — NIFEDIPINE 30 MG: 30 TABLET, FILM COATED, EXTENDED RELEASE ORAL at 17:31

## 2024-06-21 RX ADMIN — SODIUM CHLORIDE, PRESERVATIVE FREE 10 ML: 5 INJECTION INTRAVENOUS at 08:26

## 2024-06-21 RX ADMIN — CLONIDINE HYDROCHLORIDE 0.1 MG: 0.1 TABLET ORAL at 03:02

## 2024-06-21 RX ADMIN — HYDRALAZINE HYDROCHLORIDE 100 MG: 50 TABLET ORAL at 14:09

## 2024-06-21 RX ADMIN — CARVEDILOL 12.5 MG: 6.25 TABLET, FILM COATED ORAL at 16:26

## 2024-06-21 RX ADMIN — ATORVASTATIN CALCIUM 40 MG: 40 TABLET, FILM COATED ORAL at 21:13

## 2024-06-21 RX ADMIN — HYDRALAZINE HYDROCHLORIDE 10 MG: 20 INJECTION, SOLUTION INTRAMUSCULAR; INTRAVENOUS at 01:06

## 2024-06-21 RX ADMIN — CARVEDILOL 12.5 MG: 6.25 TABLET, FILM COATED ORAL at 08:25

## 2024-06-21 RX ADMIN — CLONIDINE HYDROCHLORIDE 0.1 MG: 0.1 TABLET ORAL at 21:13

## 2024-06-21 RX ADMIN — PANTOPRAZOLE SODIUM 40 MG: 40 TABLET, DELAYED RELEASE ORAL at 06:13

## 2024-06-21 RX ADMIN — HYDRALAZINE HYDROCHLORIDE 10 MG: 20 INJECTION, SOLUTION INTRAMUSCULAR; INTRAVENOUS at 11:23

## 2024-06-21 RX ADMIN — MEMANTINE HYDROCHLORIDE 5 MG: 10 TABLET ORAL at 08:25

## 2024-06-21 RX ADMIN — HYDRALAZINE HYDROCHLORIDE 5 MG: 20 INJECTION, SOLUTION INTRAMUSCULAR; INTRAVENOUS at 00:52

## 2024-06-21 RX ADMIN — ISOSORBIDE MONONITRATE 30 MG: 30 TABLET, EXTENDED RELEASE ORAL at 08:24

## 2024-06-21 RX ADMIN — ACETAMINOPHEN 650 MG: 325 TABLET ORAL at 02:30

## 2024-06-21 RX ADMIN — NIFEDIPINE 30 MG: 30 TABLET, FILM COATED, EXTENDED RELEASE ORAL at 08:24

## 2024-06-21 RX ADMIN — HYDRALAZINE HYDROCHLORIDE 100 MG: 50 TABLET ORAL at 06:14

## 2024-06-21 RX ADMIN — MEMANTINE HYDROCHLORIDE 5 MG: 10 TABLET ORAL at 21:12

## 2024-06-21 RX ADMIN — POTASSIUM CHLORIDE 20 MEQ: 1500 TABLET, EXTENDED RELEASE ORAL at 08:27

## 2024-06-21 RX ADMIN — SODIUM CHLORIDE, PRESERVATIVE FREE 10 ML: 5 INJECTION INTRAVENOUS at 21:14

## 2024-06-21 ASSESSMENT — PAIN SCALES - GENERAL
PAINLEVEL_OUTOF10: 0
PAINLEVEL_OUTOF10: 3

## 2024-06-21 ASSESSMENT — PAIN DESCRIPTION - LOCATION: LOCATION: HEAD

## 2024-06-21 ASSESSMENT — PAIN - FUNCTIONAL ASSESSMENT: PAIN_FUNCTIONAL_ASSESSMENT: ACTIVITIES ARE NOT PREVENTED

## 2024-06-21 ASSESSMENT — PAIN DESCRIPTION - ORIENTATION: ORIENTATION: RIGHT;LEFT

## 2024-06-21 ASSESSMENT — PAIN DESCRIPTION - DESCRIPTORS: DESCRIPTORS: DISCOMFORT;DULL

## 2024-06-21 NOTE — CARE COORDINATION
6/21/24, 1:27 PM EDT  DISCHARGE PLANNING EVALUATION    YVONNE spoke with brothclaire Campbell via phone and discussed discharge planning. YVONNE notified him that [facility] would start precert today and it may come back today or over the weekend. Adrian voiced understanding.     YVONNE faxed PT and OT notes to [facility] and asked admissions, Freida, Amanda is out to lunch, to start precert. Asked Freida if Amanda could call brother dArian and answer general questions he may have about ECF.       6/21/24, 1:29 PM EDT    Patient goals/plan/ treatment preferences discussed by  and .  Patient goals/plan/ treatment preferences reviewed with patient/ family.  Patient/ family verbalize understanding of discharge plan and are in agreement with goal/plan/treatment preferences.  Understanding was demonstrated using the teach back method.  AVS provided by RN at time of discharge, which includes all necessary medical information pertaining to the patients current course of illness, treatment, post-discharge goals of care, and treatment preferences.     Services At/After Discharge: Skilled Nursing Facility (SNF) [facility] at [facility].     Patient to possibly discharge over the weekend if precert is approved and medically ready. Precert is still currently pending. Adrian aware and agreeable to discharge plan.    YVONNE provided numbers of weekend CM and RN station to [facility] in case of an approval over the weekend.    RN made aware and discharge instructions and transportation forms are placed on chart.

## 2024-06-21 NOTE — PROCEDURES
PROCEDURE NOTE  Date: 6/21/2024   Name: Berenice Fitzgerald  YOB: 1950    Procedures  12 lead EKG completed. Results handed to Valarie GÓMEZ. Meg

## 2024-06-22 LAB
ANION GAP SERPL CALC-SCNC: 12 MEQ/L (ref 8–16)
BACTERIA UR CULT: ABNORMAL
BUN SERPL-MCNC: 29 MG/DL (ref 7–22)
CALCIUM SERPL-MCNC: 9.3 MG/DL (ref 8.5–10.5)
CHLORIDE SERPL-SCNC: 104 MEQ/L (ref 98–111)
CO2 SERPL-SCNC: 21 MEQ/L (ref 23–33)
CREAT SERPL-MCNC: 2.6 MG/DL (ref 0.4–1.2)
DEPRECATED RDW RBC AUTO: 59.7 FL (ref 35–45)
ERYTHROCYTE [DISTWIDTH] IN BLOOD BY AUTOMATED COUNT: 17.2 % (ref 11.5–14.5)
GFR SERPL CREATININE-BSD FRML MDRD: 19 ML/MIN/1.73M2
GLUCOSE SERPL-MCNC: 88 MG/DL (ref 70–108)
HCT VFR BLD AUTO: 26.4 % (ref 37–47)
HGB BLD-MCNC: 8.4 GM/DL (ref 12–16)
MAGNESIUM SERPL-MCNC: 1.9 MG/DL (ref 1.6–2.4)
MCH RBC QN AUTO: 30.4 PG (ref 26–33)
MCHC RBC AUTO-ENTMCNC: 31.8 GM/DL (ref 32.2–35.5)
MCV RBC AUTO: 95.7 FL (ref 81–99)
ORGANISM: ABNORMAL
PLATELET # BLD AUTO: 276 THOU/MM3 (ref 130–400)
PMV BLD AUTO: 9 FL (ref 9.4–12.4)
POTASSIUM SERPL-SCNC: 3.5 MEQ/L (ref 3.5–5.2)
RBC # BLD AUTO: 2.76 MILL/MM3 (ref 4.2–5.4)
SODIUM SERPL-SCNC: 137 MEQ/L (ref 135–145)
WBC # BLD AUTO: 6.1 THOU/MM3 (ref 4.8–10.8)

## 2024-06-22 PROCEDURE — 6370000000 HC RX 637 (ALT 250 FOR IP)

## 2024-06-22 PROCEDURE — 83835 ASSAY OF METANEPHRINES: CPT

## 2024-06-22 PROCEDURE — 6360000002 HC RX W HCPCS

## 2024-06-22 PROCEDURE — 83735 ASSAY OF MAGNESIUM: CPT

## 2024-06-22 PROCEDURE — 80048 BASIC METABOLIC PNL TOTAL CA: CPT

## 2024-06-22 PROCEDURE — 84244 ASSAY OF RENIN: CPT

## 2024-06-22 PROCEDURE — 6370000000 HC RX 637 (ALT 250 FOR IP): Performed by: INTERNAL MEDICINE

## 2024-06-22 PROCEDURE — 1200000000 HC SEMI PRIVATE

## 2024-06-22 PROCEDURE — 2580000003 HC RX 258

## 2024-06-22 PROCEDURE — 99231 SBSQ HOSP IP/OBS SF/LOW 25: CPT | Performed by: INTERNAL MEDICINE

## 2024-06-22 PROCEDURE — 36415 COLL VENOUS BLD VENIPUNCTURE: CPT

## 2024-06-22 PROCEDURE — 85027 COMPLETE CBC AUTOMATED: CPT

## 2024-06-22 PROCEDURE — 82088 ASSAY OF ALDOSTERONE: CPT

## 2024-06-22 RX ADMIN — EZETIMIBE 10 MG: 10 TABLET ORAL at 20:33

## 2024-06-22 RX ADMIN — NIFEDIPINE 60 MG: 30 TABLET, FILM COATED, EXTENDED RELEASE ORAL at 08:47

## 2024-06-22 RX ADMIN — ATORVASTATIN CALCIUM 40 MG: 40 TABLET, FILM COATED ORAL at 20:33

## 2024-06-22 RX ADMIN — CLONIDINE HYDROCHLORIDE 0.1 MG: 0.1 TABLET ORAL at 13:29

## 2024-06-22 RX ADMIN — SERTRALINE 100 MG: 100 TABLET, FILM COATED ORAL at 08:48

## 2024-06-22 RX ADMIN — HYDRALAZINE HYDROCHLORIDE 10 MG: 20 INJECTION, SOLUTION INTRAMUSCULAR; INTRAVENOUS at 06:48

## 2024-06-22 RX ADMIN — CARVEDILOL 12.5 MG: 6.25 TABLET, FILM COATED ORAL at 18:26

## 2024-06-22 RX ADMIN — POTASSIUM CHLORIDE 20 MEQ: 1500 TABLET, EXTENDED RELEASE ORAL at 08:49

## 2024-06-22 RX ADMIN — CLONIDINE HYDROCHLORIDE 0.1 MG: 0.1 TABLET ORAL at 08:47

## 2024-06-22 RX ADMIN — HYDRALAZINE HYDROCHLORIDE 100 MG: 50 TABLET ORAL at 13:29

## 2024-06-22 RX ADMIN — ISOSORBIDE MONONITRATE 30 MG: 30 TABLET, EXTENDED RELEASE ORAL at 08:47

## 2024-06-22 RX ADMIN — HYDRALAZINE HYDROCHLORIDE 100 MG: 50 TABLET ORAL at 20:33

## 2024-06-22 RX ADMIN — HYDRALAZINE HYDROCHLORIDE 100 MG: 50 TABLET ORAL at 00:26

## 2024-06-22 RX ADMIN — HYDRALAZINE HYDROCHLORIDE 100 MG: 50 TABLET ORAL at 05:16

## 2024-06-22 RX ADMIN — CARVEDILOL 12.5 MG: 6.25 TABLET, FILM COATED ORAL at 08:47

## 2024-06-22 RX ADMIN — MEMANTINE HYDROCHLORIDE 5 MG: 10 TABLET ORAL at 20:33

## 2024-06-22 RX ADMIN — SODIUM CHLORIDE, PRESERVATIVE FREE 10 ML: 5 INJECTION INTRAVENOUS at 08:48

## 2024-06-22 RX ADMIN — POTASSIUM CHLORIDE 20 MEQ: 1500 TABLET, EXTENDED RELEASE ORAL at 18:26

## 2024-06-22 RX ADMIN — PANTOPRAZOLE SODIUM 40 MG: 40 TABLET, DELAYED RELEASE ORAL at 05:15

## 2024-06-22 RX ADMIN — SODIUM CHLORIDE, PRESERVATIVE FREE 10 ML: 5 INJECTION INTRAVENOUS at 20:33

## 2024-06-22 RX ADMIN — MEMANTINE HYDROCHLORIDE 5 MG: 10 TABLET ORAL at 08:48

## 2024-06-22 ASSESSMENT — PAIN SCALES - GENERAL
PAINLEVEL_OUTOF10: 0

## 2024-06-23 LAB
ANION GAP SERPL CALC-SCNC: 12 MEQ/L (ref 8–16)
BUN SERPL-MCNC: 27 MG/DL (ref 7–22)
CALCIUM SERPL-MCNC: 9.1 MG/DL (ref 8.5–10.5)
CHLORIDE SERPL-SCNC: 104 MEQ/L (ref 98–111)
CO2 SERPL-SCNC: 22 MEQ/L (ref 23–33)
CREAT SERPL-MCNC: 2.4 MG/DL (ref 0.4–1.2)
DEPRECATED RDW RBC AUTO: 61.5 FL (ref 35–45)
ERYTHROCYTE [DISTWIDTH] IN BLOOD BY AUTOMATED COUNT: 17 % (ref 11.5–14.5)
GFR SERPL CREATININE-BSD FRML MDRD: 21 ML/MIN/1.73M2
GLUCOSE SERPL-MCNC: 91 MG/DL (ref 70–108)
HCT VFR BLD AUTO: 28 % (ref 37–47)
HGB BLD-MCNC: 8.7 GM/DL (ref 12–16)
MAGNESIUM SERPL-MCNC: 1.9 MG/DL (ref 1.6–2.4)
MCH RBC QN AUTO: 31 PG (ref 26–33)
MCHC RBC AUTO-ENTMCNC: 31.1 GM/DL (ref 32.2–35.5)
MCV RBC AUTO: 99.6 FL (ref 81–99)
PLATELET # BLD AUTO: 257 THOU/MM3 (ref 130–400)
PMV BLD AUTO: 9 FL (ref 9.4–12.4)
POTASSIUM SERPL-SCNC: 3.7 MEQ/L (ref 3.5–5.2)
RBC # BLD AUTO: 2.81 MILL/MM3 (ref 4.2–5.4)
SODIUM SERPL-SCNC: 138 MEQ/L (ref 135–145)
WBC # BLD AUTO: 6.5 THOU/MM3 (ref 4.8–10.8)

## 2024-06-23 PROCEDURE — 36415 COLL VENOUS BLD VENIPUNCTURE: CPT

## 2024-06-23 PROCEDURE — 6370000000 HC RX 637 (ALT 250 FOR IP)

## 2024-06-23 PROCEDURE — 2580000003 HC RX 258

## 2024-06-23 PROCEDURE — 1200000000 HC SEMI PRIVATE

## 2024-06-23 PROCEDURE — 6370000000 HC RX 637 (ALT 250 FOR IP): Performed by: INTERNAL MEDICINE

## 2024-06-23 PROCEDURE — 80048 BASIC METABOLIC PNL TOTAL CA: CPT

## 2024-06-23 PROCEDURE — 99231 SBSQ HOSP IP/OBS SF/LOW 25: CPT | Performed by: INTERNAL MEDICINE

## 2024-06-23 PROCEDURE — 83735 ASSAY OF MAGNESIUM: CPT

## 2024-06-23 PROCEDURE — 85027 COMPLETE CBC AUTOMATED: CPT

## 2024-06-23 RX ADMIN — MEMANTINE HYDROCHLORIDE 5 MG: 10 TABLET ORAL at 20:25

## 2024-06-23 RX ADMIN — SODIUM CHLORIDE, PRESERVATIVE FREE 10 ML: 5 INJECTION INTRAVENOUS at 20:25

## 2024-06-23 RX ADMIN — POTASSIUM CHLORIDE 20 MEQ: 1500 TABLET, EXTENDED RELEASE ORAL at 09:10

## 2024-06-23 RX ADMIN — HYDRALAZINE HYDROCHLORIDE 100 MG: 50 TABLET ORAL at 15:40

## 2024-06-23 RX ADMIN — POTASSIUM CHLORIDE 20 MEQ: 1500 TABLET, EXTENDED RELEASE ORAL at 15:40

## 2024-06-23 RX ADMIN — SERTRALINE 100 MG: 100 TABLET, FILM COATED ORAL at 11:04

## 2024-06-23 RX ADMIN — HYDRALAZINE HYDROCHLORIDE 100 MG: 50 TABLET ORAL at 05:51

## 2024-06-23 RX ADMIN — ISOSORBIDE MONONITRATE 30 MG: 30 TABLET, EXTENDED RELEASE ORAL at 09:10

## 2024-06-23 RX ADMIN — CARVEDILOL 12.5 MG: 6.25 TABLET, FILM COATED ORAL at 09:10

## 2024-06-23 RX ADMIN — CLONIDINE HYDROCHLORIDE 0.1 MG: 0.1 TABLET ORAL at 15:40

## 2024-06-23 RX ADMIN — CARVEDILOL 12.5 MG: 6.25 TABLET, FILM COATED ORAL at 15:40

## 2024-06-23 RX ADMIN — MEMANTINE HYDROCHLORIDE 5 MG: 10 TABLET ORAL at 09:10

## 2024-06-23 RX ADMIN — SODIUM CHLORIDE, PRESERVATIVE FREE 10 ML: 5 INJECTION INTRAVENOUS at 09:12

## 2024-06-23 RX ADMIN — HYDRALAZINE HYDROCHLORIDE 100 MG: 50 TABLET ORAL at 20:25

## 2024-06-23 RX ADMIN — ATORVASTATIN CALCIUM 40 MG: 40 TABLET, FILM COATED ORAL at 20:25

## 2024-06-23 RX ADMIN — CLONIDINE HYDROCHLORIDE 0.1 MG: 0.1 TABLET ORAL at 09:10

## 2024-06-23 RX ADMIN — PANTOPRAZOLE SODIUM 40 MG: 40 TABLET, DELAYED RELEASE ORAL at 05:51

## 2024-06-23 RX ADMIN — NIFEDIPINE 60 MG: 30 TABLET, FILM COATED, EXTENDED RELEASE ORAL at 09:10

## 2024-06-23 ASSESSMENT — PAIN SCALES - GENERAL: PAINLEVEL_OUTOF10: 0

## 2024-06-24 VITALS
RESPIRATION RATE: 16 BRPM | OXYGEN SATURATION: 98 % | WEIGHT: 169.97 LBS | SYSTOLIC BLOOD PRESSURE: 173 MMHG | HEART RATE: 65 BPM | HEIGHT: 66 IN | BODY MASS INDEX: 27.32 KG/M2 | TEMPERATURE: 98.2 F | DIASTOLIC BLOOD PRESSURE: 72 MMHG

## 2024-06-24 LAB
ALDOST SERPL-MCNC: 36 NG/DL
METANEPHRINES TOTAL URINE: NORMAL

## 2024-06-24 PROCEDURE — 6370000000 HC RX 637 (ALT 250 FOR IP): Performed by: INTERNAL MEDICINE

## 2024-06-24 PROCEDURE — 6370000000 HC RX 637 (ALT 250 FOR IP)

## 2024-06-24 PROCEDURE — 2580000003 HC RX 258

## 2024-06-24 RX ORDER — CARVEDILOL 12.5 MG/1
12.5 TABLET ORAL 2 TIMES DAILY WITH MEALS
Qty: 60 TABLET | Refills: 3 | Status: SHIPPED | OUTPATIENT
Start: 2024-06-24

## 2024-06-24 RX ORDER — MEMANTINE HYDROCHLORIDE 5 MG/1
5 TABLET ORAL 2 TIMES DAILY
Qty: 60 TABLET | Refills: 3 | Status: SHIPPED | OUTPATIENT
Start: 2024-06-24

## 2024-06-24 RX ORDER — NIFEDIPINE 30 MG/1
60 TABLET, EXTENDED RELEASE ORAL DAILY
Qty: 30 TABLET | Refills: 3 | Status: SHIPPED | OUTPATIENT
Start: 2024-06-25

## 2024-06-24 RX ORDER — PANTOPRAZOLE SODIUM 40 MG/1
40 TABLET, DELAYED RELEASE ORAL
Qty: 30 TABLET | Refills: 3 | Status: SHIPPED | OUTPATIENT
Start: 2024-06-25

## 2024-06-24 RX ORDER — CLONIDINE HYDROCHLORIDE 0.1 MG/1
0.1 TABLET ORAL 3 TIMES DAILY
Qty: 60 TABLET | Refills: 3 | Status: SHIPPED | OUTPATIENT
Start: 2024-06-24

## 2024-06-24 RX ADMIN — PANTOPRAZOLE SODIUM 40 MG: 40 TABLET, DELAYED RELEASE ORAL at 05:34

## 2024-06-24 RX ADMIN — ISOSORBIDE MONONITRATE 30 MG: 30 TABLET, EXTENDED RELEASE ORAL at 08:31

## 2024-06-24 RX ADMIN — SODIUM CHLORIDE, PRESERVATIVE FREE 10 ML: 5 INJECTION INTRAVENOUS at 08:31

## 2024-06-24 RX ADMIN — POTASSIUM CHLORIDE 20 MEQ: 1500 TABLET, EXTENDED RELEASE ORAL at 08:30

## 2024-06-24 RX ADMIN — NIFEDIPINE 60 MG: 30 TABLET, FILM COATED, EXTENDED RELEASE ORAL at 08:30

## 2024-06-24 RX ADMIN — MEMANTINE HYDROCHLORIDE 5 MG: 10 TABLET ORAL at 08:30

## 2024-06-24 RX ADMIN — CLONIDINE HYDROCHLORIDE 0.1 MG: 0.1 TABLET ORAL at 08:30

## 2024-06-24 RX ADMIN — HYDRALAZINE HYDROCHLORIDE 100 MG: 50 TABLET ORAL at 05:34

## 2024-06-24 RX ADMIN — SERTRALINE 100 MG: 100 TABLET, FILM COATED ORAL at 08:31

## 2024-06-24 RX ADMIN — CARVEDILOL 12.5 MG: 6.25 TABLET, FILM COATED ORAL at 08:30

## 2024-06-24 ASSESSMENT — PAIN SCALES - GENERAL: PAINLEVEL_OUTOF10: 0

## 2024-06-24 NOTE — CARE COORDINATION
Pt verbalized understanding and gave permission for possible discharge within 4 hours of receiving IMM.

## 2024-06-24 NOTE — CARE COORDINATION
6/24/24, 8:49 AM EDT    Patient goals/plan/ treatment preferences discussed by  and .  Patient goals/plan/ treatment preferences reviewed with patient/ family.  Patient/ family verbalize understanding of discharge plan and are in agreement with goal/plan/treatment preferences.  Understanding was demonstrated using the teach back method.  AVS provided by RN at time of discharge, which includes all necessary medical information pertaining to the patients current course of illness, treatment, post-discharge goals of care, and treatment preferences.     Services At/After Discharge: Skilled Nursing Facility (SNF) [facility]    YVONNE received a call from Brianne at [facility] and she reported that precert has been approved.     YVONNE updated attending resident and he plans discharge today.    YVONNE called and spoke with brother Adrian and notified him of precert approval and that YVONNE will arrange transportation.     YVONNE scheduled transportation at 1pm.     YVONNE notified brother Adrian.    YVONNE updated Brianne at [facility].     RN made aware and discharge instructions placed on chart.

## 2024-06-24 NOTE — PROGRESS NOTES
Pharmacy Renal Adjustment    Pharmacy renally adjusted the following medication(s) per P&T approved policy: Memantine    Recent Labs     06/14/24  0341 06/15/24  0325   BUN 34* 37*   CREATININE 3.0* 3.4*     Estimated Creatinine Clearance: 16 mL/min (A) (based on SCr of 3.4 mg/dL (HH)).    Assessment:  CHRISTOPHER on CKD, baseline sCr = 1.8-2.2 per Dr DORI Garcia consult    Plan:   Decrease Memantine from 10 mg BID to 5 mg BID    Please call pharmacy with any questions.    Shira Johnson PharmD 6/15/2024 11:39 AM      
    Hospitalist Progress Note  Patient:  Berenice Fitzgerald  YOB: 1950  Date of Service: 6/14/2024  Admission Date:6/13/2024  Code Status: Limited  NIHSS: No    Assessment / Plan     Acute blood loss anemia: Patient has multiple falls since 5/25.  Her last fall on 6/12 resulted in hip dislocation.  Hematomas noted over the left hip.  Hemoglobin at outside facility was 5.9-6.7 post 1 unit PRBC.  CT abdomen pelvis at outside facility shows subcutaneous hematoma on the right.  Second unit transfused on 6/13/2024.  Continue to monitor H&H and transfuse if hemoglobin less than 7 or hemodynamically unstable  Recurrent falls: Noted on chart review.  Denies any syncopal episodes.  Patient denies any memory of fall.  Brother who is POA has noted multiple falls of patient.  Patient uses walker at baseline.  See image for hematoma.  CHRISTOPHER on CKD 4: Nephrologist .  Recommended repeat CT of the left hip without contrast.  Discussed risk benefits with brother who is POA in regards to contrast and angiogram with kidney injury.  Will attempt conservative measures however if bleeding source is on found family okay to proceed with angiogram at the risk of hurting the kidneys.  Trending BMP.  CHF: Per brother, history of heart failure.  Done on [facility].  Was scheduled for a cardiac cath for evaluation however delayed due to worsening kidney function.  Repeat echo pending.  Restarted Coreg due to high blood pressure.  History of recurrent UTIs: Per brother history of recurrent UTIs.  Urine culture pending.  Hypertension: Slowly uptitrating home hypertensives per blood pressure.  Per brother, patient would develop hypertension when she develops UTI or any stressors however her baseline is low around 120/80.  Insomnia: On trazodone 50 nightly  Hyperlipidemia: On Zetia and Lipitor  Dementia: On memantine  Depression: On Zoloft  Goals of care: Limited x 4.  Palliative care consulted.     Consultations to: None  ART 
    Hospitalist Progress Note  Patient:  Berenice Fitzgerald  YOB: 1950  Date of Service: 6/15/2024  Admission Date:6/13/2024  Code Status: Limited  NIHSS: No    Assessment / Plan     Acute blood loss anemia: Patient has multiple falls since 5/25.  Her last fall on 6/12 resulted in hip dislocation.  Hematomas noted over the left hip.  Hemoglobin at outside facility was 5.9-6.7 post 1 unit PRBC.  CT abdomen pelvis at outside facility shows subcutaneous hematoma on the right.  Second unit transfused on 6/13/2024.  Continue to monitor H&H and transfuse if hemoglobin less than 7 or hemodynamically unstable  Radiology reading of CT shows 15.6 x 9.7 x 7.5 cm hematoma on the left eye.  Unable to compare due to partial imaging.  Discussed with brother who is POA and patient with regards to angiography.  Dr. Kyle has also seen the patient and discussed risk benefits indications alternatives given angiographically and CHRISTOPHER on CKD 4.  Patient and brother are both aware of risk and has accepted to proceed with IV contrast study given active suspected bleeding.  Will repeat CT of the hip and evaluate for extravasation.  Plan for IR guided angiography with embolization if evidence for extravasation is noted.  Recurrent falls: Noted on chart review.  Denies any syncopal episodes.  Patient denies any memory of fall.  Brother who is POA has noted multiple falls of patient.  Patient uses walker at baseline.  See image for hematoma.  CHRISTOPHER on CKD 4: Nephrologist .  Recommended repeat CT of the left hip without contrast.  Discussed risk benefits with brother who is POA in regards to contrast and angiogram with kidney injury.  Will attempt conservative measures however if bleeding source is on found family okay to proceed with angiogram at the risk of hurting the kidneys.  Trending BMP.  CHF: Per brother, history of heart failure.  Done on [facility].  Was scheduled for a cardiac cath for evaluation however 
    Hospitalist Progress Note  Patient:  Berenice Fitzgerald  YOB: 1950  Date of Service: 6/16/2024  Admission Date:6/13/2024  Code Status: Limited  NIHSS: No    Assessment / Plan     Acute blood loss anemia: Patient has multiple falls since 5/25.  Her last fall on 6/12 resulted in hip dislocation.  Hematomas noted over the left hip.  Hemoglobin at outside facility was 5.9-6.7 post 1 unit PRBC.  CT abdomen pelvis at outside facility shows subcutaneous hematoma on the right.  Second unit transfused on 6/13/2024.  Hb of 6.5 6/16/2024 AM. Transfuse 1u (total 2u pRBC during admission).   Reviewed CT of LE, appears to have some regression of hematoma. On examination, hematomas appear stable.  Defer angio and embolization at this time, given regression  However, if worsening blood count or hemodynamic status, will need IR evaluation.  Continue to monitor H&H and transfuse if hemoglobin less than 7 or hemodynamically unstable  Recurrent falls: Noted on chart review.  Denies any syncopal episodes.  Patient denies any memory of fall.  Brother who is POA has noted multiple falls of patient.  Patient uses walker at baseline.  See image for hematoma.  CHRISTOPHER on CKD 4:   Nephrologist .   Cr elevated but stable, 3.0.   No significant electroltye derangements  Will attempt conservative measures however if bleeding source is on found family okay to proceed with angiogram at the risk of hurting the kidneys.  Trending BMP.  CHF: Per brother, history of heart failure.  Done on [facility].  Was scheduled for a cardiac cath for evaluation however delayed due to worsening kidney function.  Repeat echo pending.  Restarted Coreg due to high blood pressure.  History of recurrent UTIs: Per brother history of recurrent UTIs.  Urine culture negative UA negative.  Hypertension: Slowly uptitrating home hypertensives per blood pressure.  Per brother, patient would develop hypertension when she develops UTI or any stressors 
    Hospitalist Progress Note  Patient:  Berenice Fitzgerald  YOB: 1950  Date of Service: 6/18/2024  Admission Date:6/13/2024  Code Status: Limited  NIHSS: No    Assessment / Plan     Acute blood loss anemia: Patient has multiple falls since 5/25.  Her last fall on 6/12 resulted in hip dislocation.  Hematomas noted over the left hip.  Hemoglobin at outside facility was 5.9-6.7 post 1 unit PRBC.  CT abdomen pelvis at outside facility shows subcutaneous hematoma on the right.  Second unit transfused on 6/13/2024. Transfuse 1u (total 2u pRBC during admission).   Reviewed CT of LE on 6/15/2024, appears to have some regression of hematoma.  Repeat CT of LE shows some mild increase in hematoma.  On examination, hematomas appear worsening.  Repeat serial CT of the hip shows worsening hematoma however mild.  Hemoglobin-stable at this time.  New margins added on patient  Defer angio and embolization at this time, given hemoglobin stability and stable hemodynamics.  However, if worsening blood count or hemodynamic status, will need IR evaluation.  Continue to monitor H&H and transfuse if hemoglobin less than 7 or hemodynamically unstable  Recurrent falls: Noted on chart review.  Denies any syncopal episodes.  Patient denies any memory of fall.  Brother who is POA has noted multiple falls of patient.  Patient uses walker at baseline.  See image for hematoma.  CHRISTOPHER on CKD 4:   Nephrologist .   Cr stable  No significant electroltye derangements  Will attempt conservative measures however if bleeding source is on found family okay to proceed with angiogram at the risk of hurting the kidneys.  Trending BMP.  CHF: Per brother, history of heart failure.  Done on [facility].  Was scheduled for a cardiac cath for evaluation however delayed due to worsening kidney function.  Repeat echo pending (RN aware on 6/17/2024).  Restarted Coreg due to high blood pressure.  History of recurrent UTIs: Per brother history of 
  Physician Progress Note      PATIENT:               HUGO BRICENO  [facility] #:                  [id number]  :                       1950  ADMIT DATE:       2024 2:27 AM  DISCH DATE:  RESPONDING  PROVIDER #:        BARBY SUMNER          QUERY TEXT:    Pt admitted with left hip contusion and has CHF documented. If possible,   please document in progress notes and discharge summary further specificity   regarding the type and acuity of CHF:    The medical record reflects the following:  Risk Factors: HTN , CKD  Clinical Indicators:  ECHO Mildly reduced left ventricular systolic   function with a visually estimated EF of 40 - 45%. Left ventricle size is   normal. Normal wall thickness. Moderate global hypokinesis present. Normal   diastolic function.  Treatment: Restarted Coreg due to high blood pressure. Bumex 5mg po 3x day,   Coreg 12.5 x2 day    Thank you, Mandy CDS  Options provided:  -- Acute on Chronic Systolic CHF/HFrEF  -- Acute Systolic and Diastolic CHF  -- Chronic Systolic CHF/HFrEF  -- Chronic Systolic and Diastolic CHF  -- Other - I will add my own diagnosis  -- Disagree - Not applicable / Not valid  -- Disagree - Clinically unable to determine / Unknown  -- Refer to Clinical Documentation Reviewer    PROVIDER RESPONSE TEXT:    This patient has chronic systolic and diastolic CHF.    Query created by: Maribell Nam on 2024 9:59 AM      Electronically signed by:  BARBY SUMNER 2024 4:59 PM          
  Physician Progress Note      PATIENT:               HUGO BRICENO  [facility] #:                  [id number]  :                       1950  ADMIT DATE:       2024 2:27 AM  DISCH DATE:  RESPONDING  PROVIDER #:        Akbar Ken DO          QUERY TEXT:    Pt admitted with left hip hematoma, multiple falls . Pt noted to have  acute   mentation changes overnight - . If possible, please document in the   progress notes and discharge summary if you are evaluating and / or treating   any of the following:    The medical record reflects the following:  Risk Factors: baseline Dementia , HTN , Atarax  Clinical Indicators: PN  : Overnight patient noted to have high blood   pressure which improved with antianxiety medications including Atarax.    Patient also noted to have acute mentation changes which is outside her   baseline.  She is less appropriate and more somnolent.  Possibly from Atarax   dose. Patient noted to have worsening blood pressure in the 200s which   improved with Atarax or antianxiety medications.  Increased blood pressure   medications including hydralazine to 100 3 times daily  Treatment: MRI- negative , hydralazine, Buspar    Thank you, Mandy CDS  Options provided:  -- Hypertensive encephalopathy  -- Metabolic encephalopathy  -- Toxic encephalopathy  -- Toxic metabolic encephalopathy  -- Other - I will add my own diagnosis  -- Disagree - Not applicable / Not valid  -- Disagree - Clinically unable to determine / Unknown  -- Refer to Clinical Documentation Reviewer    PROVIDER RESPONSE TEXT:    This patient has toxic metabolic encephalopathy.    Query created by: Maribell Nam on 2024 9:51 AM      Electronically signed by:  Akbar Ken DO 2024 9:58 AM          
[facility]  OCCUPATIONAL THERAPY MISSED TREATMENT NOTE  STRZ ICU STEPDOWN TELEMETRY 4K  4K-13/013-A      Date: 2024  Patient Name: Berenice Fitzgerald        CSN: [id number]   : 1950  (73 y.o.)  Gender: female                REASON FOR MISSED TREATMENT:  RN approved session, patient supine in bed and alert, however lethargic and having difficulty staying awake and answering questions appropriately. Patient's last BP reading on screen at 184/163. This writer took BP again while patient supine with reading at 133/72. Patient unable to stay awake to therapeutic level of participation at time of first attempt. OT to check back as able and time allows.                
IV team messaged to assist in getting IV access to continue Cardene gtt d/t loss of IV site during MRI. Awaiting response.  
[facility]  OCCUPATIONAL THERAPY MISSED TREATMENT NOTE  STRZ ICU STEPDOWN TELEMETRY 4K  4K-13/013-A      Date: 2024  Patient Name: Berenice Fitzgerald        CSN: [id number]   : 1950  (73 y.o.)  Gender: female                REASON FOR MISSED TREATMENT: Patient Refused.  ; patient seated up in recliner with eyes closed upon OT arrival. Patient easily roused, however keeping eyes closed. Patient refusing OT stating she is too tired. Edu in purpose/benefits of OT and discussed patient's goals to return home with patient continuing to refuse. OT to check back another date .               
[facility]  INPATIENT OCCUPATIONAL THERAPY  STRZ ICU STEPDOWN TELEMETRY 4K  EVALUATION    Time:   Time In: 946  Time Out: 1009  Timed Code Treatment Minutes: 15 Minutes  Minutes: 23          Date: 2024  Patient Name: Berenice Fitzgerald,   Gender: female      MRN: [id number]  : 1950  (73 y.o.)  Referring Practitioner: Fabio Washington DO  Diagnosis: Anemia  Additional Pertinent Hx: Per H&P: \"Berenice Fitzgerald is a 73 y.o. female with PMHx of CHF, CKD, dementia, insomnia, depression, HTN, HLD who presents to [facility] as a transfer from [facility] on  with anemia.  History obtained via patient as well as her brother who is her POA.  Patient currently resides at [facility] and has been experiencing falls since .  Patient notes she has some blackening of vision upon standing up.  Most recent fall was on  with concern for possible hip dislocation per [facility] facility.  Patient was initially brought to [facility].  They performed CT A/P that showed subcutaneous edema of bilateral hips with suspicion for subcutaneous hematoma on right.  CXR showed cardiomegaly.  Patient noted to have low hemoglobin at 6.7 with decreased to 5.9.  Patient received PRBC and was transferred to [facility] for further evaluation and care.  On arrival patient reported some shortness of breath that has been stable for the last 1 week.  She denies any lightheadedness, dizziness, chest pain, cough, abdominal pain, constipation, diarrhea.  Patient's brother stated she had a recent UTI and is currently on antibiotics.\"    Restrictions/Precautions:  Restrictions/Precautions: Fall Risk, General Precautions  Position Activity Restriction  Other position/activity restrictions: monitor BP    Subjective  Chart Reviewed: Yes, Orders, Progress Notes  Patient assessed for rehabilitation services?: Yes  Family / Caregiver Present: No    Subjective: Nursing approved therapy. Upon OT arrival, pt was sleeping in recliner, 
[facility]  SPEECH THERAPY MISSED TREATMENT NOTE  STRZ ICU STEPDOWN TELEMETRY 4K      Date: 2024  Patient Name: Berenice Fitzgerald        MRN: [id number]    : 1950  (73 y.o.)    REASON FOR MISSED TREATMENT:    Attempted to see patient for follow up ST services.  DALIA Stover reported that patient would soon be going off the floor for MRI.  Patient was resting in bed upon ST entry.  Patient was restless and lethargic.  Attempted to complete orientation questions.  Patient was able to state that she was in a hospital.  When asked what city she was in she finger spelled in American Sign Language [place] Session was discontinued due to patient's restlessness and inability to maintain alertness necessary to participate in session.  ST to continue to follow and provide follow up services at a later date.    Mala Guillen M.A. CCC-SLP [id number]          
[facility]  INPATIENT SPEECH THERAPY  STRZ ICU STEPDOWN TELEMETRY 4K  DAILY NOTE    TIME   SLP Individual Minutes  Time In: 1000  Time Out: 1010  Minutes: 10  Timed Code Treatment Minutes: 10 Minutes       Date: 2024  Patient Name: Berenice Fitzgerald      CSN: [id number]   : 1950  (73 y.o.)  Gender: female   Referring Physician:  Fabio Washington DO  Diagnosis: Anemia  Precautions: Fall risk  Current Diet: NPO (potential planned procedure at date)  Respiratory Status: Room Air  Swallowing Strategies: Standard Universal Swallow Precautions  Date of Last MBS/FEES: Not Applicable    Pain:  No pain reported.    Subjective:  DALIA Sheppard with approval to proceed with interventions. Upon arrival, patient resting in bed, verbal cues required to achieve wakefulness.  Confusion significant with lack of awareness to immediate context; variable affect noted with intermittent agitation to warrant re-directions. Absence for family at bedside.     Short-Term Goals:  SHORT TERM GOAL #1:  Goal 1: Patient will complete functional carryover tasks (i.e. orientation, biographics, staff relations, 3 units) with utilization of external aids and 60% accuracy, mod cues to enhance carryover of daily information.  INTERVENTIONS: Orientation  -Month: max cues+calendar  -Year: not able to elicit  -Place: not able to elicit  -City: not able to elicit  *profound deficits for spatial awareness with limited gains despite attempts to include  via calendar    Biographics  -Name: 1/2 independent, not able to elicit surname  -Birthdate: 1/3 elements independent, 2/3 not able to elicit  -Age: not able to elicit    Funtional carryover  -Call light: max cues required to locate and discern established safety device with simulation conducted to promote carryover; do not anticipate generalization    SHORT TERM GOAL #2:  Goal 2: Patient will complete functional safety/problem solving tasks (i.e. call light 
[facility]  SPEECH THERAPY  STRZ ICU STEPDOWN TELEMETRY 4K  Speech - Language - Cognitive Evaluation + Clinical Swallow Evaluation + Cognitive therapy    SLP Individual Minutes  Time In: 0916  Time Out: 0950  Minutes: 34  Timed Code Treatment Minutes: 8 Minutes     Speech, Language, Cognitive Evaluation: 18 minutes   Clinical Swallow Evaluation: 8 minutes  Cognitive therapy: 8 minutes     DIET ORDER RECOMMENDATIONS AFTER EVALUATION: Regular diet and thin liquids     Date: 2024  Patient Name: Berenice Fitzgerald      CSN: [id number]   : 1950  (73 y.o.)  Gender: female   Referring Physician:      Fabio Washington DO     Diagnosis: Anemia   Precautions: Fall risk   History of Present Illness/Injury: Patient admit to [facility] with above medical dx. Please refer to physician H&P for full patient medical history. \"Berenice Fitzgerald is a 73 y.o. female with PMHx of CHF, CKD, dementia, insomnia, depression, HTN, HLD who presents to [facility] as a transfer from [facility] on  with anemia.  History obtained via patient as well as her brother who is her POA.  Patient currently resides at SNF and has been experiencing falls since .  Patient notes she has some blackening of vision upon standing up.  Most recent fall was on  with concern for possible hip dislocation per SNF facility.  Patient was initially brought to [facility].  They performed CT A/P that showed subcutaneous edema of bilateral hips with suspicion for subcutaneous hematoma on right.  CXR showed cardiomegaly.  Patient noted to have low hemoglobin at 6.7 with decreased to 5.9.  Patient received PRBC and was transferred to [facility] for further evaluation and care.  On arrival patient reported some shortness of breath that has been stable for the last 1 week.  She denies any lightheadedness, dizziness, chest pain, cough, abdominal pain, constipation, diarrhea.  Patient's brother stated she had a recent UTI and is 
[facility]  OCCUPATIONAL THERAPY MISSED TREATMENT NOTE  STRZ ICU STEPDOWN TELEMETRY 4K  4K-13/013-A      Date: 2024  Patient Name: Berenice Fitzgerald        CSN: [id number]   : 1950  (73 y.o.)  Gender: female                REASON FOR MISSED TREATMENT: Patient Refused.  Per nurse pt was throwing up earlier today and is awaiting GI consult. Pt reports not feeling well and declines OT eval at this time. Will check back next available date.                 
[facility]  OCCUPATIONAL THERAPY MISSED TREATMENT NOTE  STRZ ICU STEPDOWN TELEMETRY 4K  4K-13/013-A      Date: 2024  Patient Name: Berenice Fitzgerald        CSN: [id number]   : 1950  (73 y.o.)  Gender: female                REASON FOR MISSED TREATMENT: Hold Treatment per Nursing. OT attempted at this time, with RN initially okaying therapy with SBP in 190s and BP meds on board. BP retaken on OTR arrival and BP was 224/93, thus session terminated, RN informed. Will check back as able               
Pt is a 73y.o. female, anxious and groggy, in 4K-13. Berenice is not very talkative but she did briefly converse and welcomed knowing of spiritual health support availability and prayer-met with gratitude by Berenice.     06/17/24 0920   Encounter Summary   Encounter Overview/Reason Spiritual/Emotional Needs   Service Provided For Patient   Referral/Consult From Multi-disciplinary team   Last Encounter  06/17/24   Complexity of Encounter Low   Begin Time 0818   End Time  0823   Total Time Calculated 5 min   Spiritual/Emotional needs   Type Spiritual Distress   Assessment/Intervention/Outcome   Assessment Anxious;Compromised coping;Impaired resilience;Peaceful   Intervention Active listening;Prayer (assurance of)/[place]ba;Nurtured Hope;Explored/Affirmed feelings, thoughts, concerns   Outcome Expressed Gratitude;Receptive       
Renal Progress Note    Pt Name: Berenice Fitzgerald  MRN: [id number]  [id number]  YOB: 1950  Admit Date: 6/13/2024  2:27 AM  Date of evaluation: 6/23/2024  Primary Care Physician: Manav Wilson DO   8B-29/029-A       Subjective:     Interval History:   No chest pain today   No SOB   Good UOP       Diet: ADULT DIET; Regular    Medications:   Scheduled Meds:   potassium chloride  20 mEq Oral BID WC    cloNIDine  0.1 mg Oral TID    NIFEdipine  60 mg Oral Daily    pantoprazole  40 mg Oral QAM AC    [Held by provider] busPIRone  5 mg Oral TID    hydrALAZINE  100 mg Oral 3 times per day    memantine  5 mg Oral BID    sodium chloride flush  5-40 mL IntraVENous 2 times per day    [Held by provider] aspirin  81 mg Oral Daily    ezetimibe  10 mg Oral Nightly    [Held by provider] traZODone  50 mg Oral Nightly    sertraline  100 mg Oral Daily    atorvastatin  40 mg Oral Nightly    carvedilol  12.5 mg Oral BID WC    isosorbide mononitrate  30 mg Oral Daily    [Held by provider] bumetanide  2 mg Oral Every Other Day     Continuous Infusions:   sodium chloride      sodium chloride      sodium chloride      dextrose         Objective:   Vitals:   BP (!) 148/87   Pulse 69   Temp 98 °F (36.7 °C) (Oral)   Resp 18   Ht 1.676 m (5' 6\")   Wt 78.2 kg (172 lb 6.4 oz)   SpO2 96%   BMI 27.83 kg/m²     I&O's:    Intake/Output Summary (Last 24 hours) at 6/23/2024 1543  Last data filed at 6/23/2024 1114  Gross per 24 hour   Intake 440 ml   Output 0 ml   Net 440 ml       I/O last 3 completed shifts:  In: 200 [P.O.:200]  Out: 950 [Urine:950]   Date 06/23/24 0000 - 06/23/24 2359   Shift 5678-5932 0896-1068 0047-2755 24 Hour Total   INTAKE   P.O.(mL/kg/hr)  440  440   Shift Total(mL/kg)  440(5.6)  440(5.6)   OUTPUT   Urine(mL/kg/hr)  0  0   Emesis/NG output(mL/kg)  0(0)  0(0)   Other(mL/kg)  0(0)  0(0)   Stool(mL/kg)  0(0)  0(0)   Blood(mL/kg)  0(0)  0(0)   Shift Total(mL/kg)  0(0)  0(0)   Weight (kg) 78.2 78.2 78.2 78.2 
Renal Progress Note    Pt Name: Berenice Fitzgerald  MRN: [id number]  [id number]  YOB: 1950  Admit Date: 6/13/2024  2:27 AM  Date of evaluation: 6/21/2024  Primary Care Physician: Manav Wilson DO   4K-13/013-A       Subjective:     Interval History:   No chest pain today   No SOB   Good UOP       Diet: ADULT DIET; Regular    Medications:   Scheduled Meds:   potassium chloride  20 mEq Oral BID WC    cloNIDine  0.1 mg Oral TID    pantoprazole  40 mg Oral QAM AC    [Held by provider] busPIRone  5 mg Oral TID    hydrALAZINE  100 mg Oral 3 times per day    NIFEdipine  30 mg Oral Daily    memantine  5 mg Oral BID    sodium chloride flush  5-40 mL IntraVENous 2 times per day    [Held by provider] aspirin  81 mg Oral Daily    ezetimibe  10 mg Oral Nightly    [Held by provider] traZODone  50 mg Oral Nightly    sertraline  100 mg Oral Daily    atorvastatin  40 mg Oral Nightly    carvedilol  12.5 mg Oral BID WC    isosorbide mononitrate  30 mg Oral Daily    [Held by provider] bumetanide  2 mg Oral Every Other Day     Continuous Infusions:   sodium chloride      sodium chloride      sodium chloride      dextrose         Objective:   Vitals:   BP (!) 155/108   Pulse 65   Temp 98.6 °F (37 °C) (Oral)   Resp 18   Ht 1.676 m (5' 6\")   Wt 81.4 kg (179 lb 7.3 oz)   SpO2 99%   BMI 28.96 kg/m²     I&O's:    Intake/Output Summary (Last 24 hours) at 6/21/2024 1431  Last data filed at 6/21/2024 0234  Gross per 24 hour   Intake 400 ml   Output 850 ml   Net -450 ml       I/O last 3 completed shifts:  In: 1623.4 [P.O.:910; I.V.:120.6; IV Piggyback:592.7]  Out: 1750 [Urine:1750]   Date 06/21/24 0000 - 06/21/24 2359   Shift 0437-4541 0822-1628 5806-8712 24 Hour Total   INTAKE   Shift Total(mL/kg)       OUTPUT   Urine(mL/kg/hr) 400(0.6)   400   Shift Total(mL/kg) 400(4.9)   400(4.9)   Weight (kg) 81.4 81.4 81.4 81.4         General appearance: mild distress due to pain  HEENT: PERRLA, EOMI, NON ICTERIC  Neck: NO LAD, NO 
Renal Progress Note    Pt Name: Berenice Fitzgerald  MRN: [id number]  [id number]  YOB: 1950  Admit Date: 6/13/2024  2:27 AM  Date of evaluation: 6/17/2024  Primary Care Physician: Manav Wilson DO   4K-13/013-A       Subjective:     Interval History:   No sob   On nicardipine drip     Diet: Diet NPO Exceptions are: Sips of Water with Meds    Medications:   Scheduled Meds:   busPIRone  5 mg Oral TID    hydrALAZINE  100 mg Oral 3 times per day    memantine  5 mg Oral BID    sodium chloride flush  5-40 mL IntraVENous 2 times per day    [Held by provider] aspirin  81 mg Oral Daily    ezetimibe  10 mg Oral Nightly    traZODone  50 mg Oral Nightly    sertraline  100 mg Oral Daily    atorvastatin  40 mg Oral Nightly    carvedilol  12.5 mg Oral BID WC    isosorbide mononitrate  30 mg Oral Daily    [Held by provider] bumetanide  2 mg Oral Every Other Day     Continuous Infusions:   niCARdipine 2.5 mg/hr (06/17/24 1341)    sodium chloride      sodium chloride      sodium chloride 50 mL/hr at 06/17/24 0613    sodium chloride      dextrose         Objective:   Vitals:   BP (!) 144/56   Pulse 72   Temp 98.3 °F (36.8 °C) (Oral)   Resp 14   Ht 1.676 m (5' 6\")   Wt 80.4 kg (177 lb 4 oz)   SpO2 94%   BMI 28.61 kg/m²     I&O's:    Intake/Output Summary (Last 24 hours) at 6/17/2024 1615  Last data filed at 6/17/2024 1408  Gross per 24 hour   Intake 4269.34 ml   Output 400 ml   Net 3869.34 ml       I/O last 3 completed shifts:  In: 5359.2 [P.O.:650; I.V.:4417.2; Blood:292]  Out: 675 [Urine:675]   Date 06/17/24 0000 - 06/17/24 2359   Shift 1040-3230 5466-7548 1174-3321 24 Hour Total   INTAKE   I.V.(mL/kg) 1253.3(15.6) 988(12.3)  2241.3(27.9)   Shift Total(mL/kg) 1253.3(15.6) 988(12.3)  2241.3(27.9)   OUTPUT   Urine(mL/kg/hr)  400(0.6)  400   Emesis/NG output(mL/kg)  0(0)  0(0)   Stool(mL/kg)  0(0)  0(0)   Shift Total(mL/kg)  400(5)  400(5)   Weight (kg) 80.4 80.4 80.4 80.4         General appearance: mild distress due 
Renal Progress Note    Pt Name: Berenice Fitzgerald  MRN: [id number]  [id number]  YOB: 1950  Admit Date: 6/13/2024  2:27 AM  Date of evaluation: 6/19/2024  Primary Care Physician: Manav Wilson DO   4K-13/013-A       Subjective:     Interval History:   No sob       Diet: ADULT DIET; Regular    Medications:   Scheduled Meds:   potassium chloride  10 mEq IntraVENous Q1H    [Held by provider] busPIRone  5 mg Oral TID    hydrALAZINE  100 mg Oral 3 times per day    NIFEdipine  30 mg Oral Daily    memantine  5 mg Oral BID    sodium chloride flush  5-40 mL IntraVENous 2 times per day    [Held by provider] aspirin  81 mg Oral Daily    ezetimibe  10 mg Oral Nightly    [Held by provider] traZODone  50 mg Oral Nightly    sertraline  100 mg Oral Daily    atorvastatin  40 mg Oral Nightly    carvedilol  12.5 mg Oral BID WC    isosorbide mononitrate  30 mg Oral Daily    [Held by provider] bumetanide  2 mg Oral Every Other Day     Continuous Infusions:   sodium chloride      sodium chloride      sodium chloride      dextrose         Objective:   Vitals:   BP (!) 119/106   Pulse 71   Temp 98.3 °F (36.8 °C) (Oral)   Resp 16   Ht 1.676 m (5' 6\")   Wt 81.6 kg (179 lb 14.3 oz)   SpO2 93%   BMI 29.04 kg/m²     I&O's:    Intake/Output Summary (Last 24 hours) at 6/19/2024 1419  Last data filed at 6/19/2024 1351  Gross per 24 hour   Intake 760 ml   Output 900 ml   Net -140 ml       I/O last 3 completed shifts:  In: 250 [P.O.:250]  Out: 1050 [Urine:1050]   Date 06/19/24 0000 - 06/19/24 2359   Shift 2104-0474 4266-1426 8464-2263 24 Hour Total   INTAKE   P.O.(mL/kg/hr) 150(0.2) 500  650   I.V.(mL/kg)  10(0.1)  10(0.1)   Shift Total(mL/kg) 150(1.8) 510(6.3)  660(8.1)   OUTPUT   Urine(mL/kg/hr) 375(0.6) 275  650   Emesis/NG output(mL/kg) 0(0)   0(0)   Other(mL/kg) 0(0)   0(0)   Stool(mL/kg) 0(0)   0(0)   Blood(mL/kg) 0(0)   0(0)   Shift Total(mL/kg) 375(4.6) 275(3.4)  650(8)   Weight (kg) 81.6 81.6 81.6 81.6         General 
Renal Progress Note    Pt Name: Berenice Fitzgerald  MRN: [id number]  [id number]  YOB: 1950  Admit Date: 6/13/2024  2:27 AM  Date of evaluation: 6/18/2024  Primary Care Physician: Manav Wilson DO   4K-13/013-A       Subjective:     Interval History:   No sob       Diet: ADULT DIET; Regular    Medications:   Scheduled Meds:   [Held by provider] busPIRone  5 mg Oral TID    hydrALAZINE  100 mg Oral 3 times per day    NIFEdipine  30 mg Oral Daily    memantine  5 mg Oral BID    sodium chloride flush  5-40 mL IntraVENous 2 times per day    [Held by provider] aspirin  81 mg Oral Daily    ezetimibe  10 mg Oral Nightly    [Held by provider] traZODone  50 mg Oral Nightly    sertraline  100 mg Oral Daily    atorvastatin  40 mg Oral Nightly    carvedilol  12.5 mg Oral BID WC    isosorbide mononitrate  30 mg Oral Daily    [Held by provider] bumetanide  2 mg Oral Every Other Day     Continuous Infusions:   niCARdipine Stopped (06/17/24 1630)    sodium chloride      sodium chloride      sodium chloride      dextrose         Objective:   Vitals:   /80   Pulse 67   Temp 98.1 °F (36.7 °C) (Oral)   Resp 18   Ht 1.676 m (5' 6\")   Wt 81.6 kg (179 lb 14.3 oz)   SpO2 94%   BMI 29.04 kg/m²     I&O's:    Intake/Output Summary (Last 24 hours) at 6/18/2024 1425  Last data filed at 6/18/2024 1116  Gross per 24 hour   Intake 0 ml   Output 425 ml   Net -425 ml       I/O last 3 completed shifts:  In: 2241.3 [I.V.:2241.3]  Out: 450 [Urine:450]   Date 06/18/24 0000 - 06/18/24 2359   Shift 2438-0420 4957-2753 3679-4072 24 Hour Total   INTAKE   P.O.(mL/kg/hr)  0  0   Shift Total(mL/kg)  0(0)  0(0)   OUTPUT   Urine(mL/kg/hr)  375  375   Emesis/NG output(mL/kg)  0(0)  0(0)   Other(mL/kg)  0(0)  0(0)   Stool(mL/kg)  0(0)  0(0)   Blood(mL/kg)  0(0)  0(0)   Shift Total(mL/kg)  375(4.6)  375(4.6)   Weight (kg) 81.6 81.6 81.6 81.6         General appearance: mild distress due to pain  HEENT: PERRLA, EOMI, NON ICTERIC  Neck: NO LAD, 
Renal Progress Note    Pt Name: Berenice Ftizgerald  MRN: [id number]  [id number]  YOB: 1950  Admit Date: 6/13/2024  2:27 AM  Date of evaluation: 6/16/2024  Primary Care Physician: Manav Wilson DO   4K-13/013-A       Subjective:     Interval History:     No sob   NO chest pain   Good urine output     Diet: ADULT DIET; Regular; No Added Salt (3-4 gm); Low Potassium (Less than 3000 mg/day); Low Phosphorus (Less than 1000 mg)    Medications:   Scheduled Meds:   memantine  5 mg Oral BID    sodium chloride flush  5-40 mL IntraVENous 2 times per day    [Held by provider] aspirin  81 mg Oral Daily    ezetimibe  10 mg Oral Nightly    traZODone  50 mg Oral Nightly    sertraline  100 mg Oral Daily    atorvastatin  40 mg Oral Nightly    carvedilol  12.5 mg Oral BID WC    hydrALAZINE  25 mg Oral 3 times per day    isosorbide mononitrate  30 mg Oral Daily    [Held by provider] bumetanide  2 mg Oral Every Other Day     Continuous Infusions:   sodium chloride      sodium chloride      sodium chloride 75 mL/hr at 06/16/24 0207    sodium chloride      dextrose         Objective:   Vitals:   /60   Pulse 60   Temp 97.8 °F (36.6 °C) (Oral)   Resp 16   Ht 1.676 m (5' 6\")   Wt 80.4 kg (177 lb 4 oz)   SpO2 96%   BMI 28.61 kg/m²     I&O's:    Intake/Output Summary (Last 24 hours) at 6/16/2024 1500  Last data filed at 6/16/2024 1139  Gross per 24 hour   Intake 1827.89 ml   Output 975 ml   Net 852.89 ml     I/O last 3 completed shifts:  In: 1435.9 [P.O.:300; I.V.:1135.9]  Out: 1775 [Urine:1775]   Date 06/16/24 0000 - 06/16/24 2359   Shift 8791-4860 0877-8623 4490-0187 24 Hour Total   INTAKE   P.O.(mL/kg/hr)  200  200   I.V.(mL/kg) 1135.9(14.1)   1135.9(14.1)   Blood(mL/kg)  292(3.6)  292(3.6)   Shift Total(mL/kg) 1135.9(14.1) 492(6.1)  1627.9(20.2)   OUTPUT   Urine(mL/kg/hr) 675(1)   675   Emesis/NG output(mL/kg) 0(0)   0(0)   Other(mL/kg) 0(0)   0(0)   Stool(mL/kg) 0(0)   0(0)   Blood(mL/kg) 0(0)   0(0)   Shift 
Report called to Claudia at [facility], all questions answered.   
Transfer  Report from [facility]  Referring Physician Anoop  Accepting Physician Dr Doyle  Patient Condition:   [birth year]. 72 [facility] at [facility] came in to ER today after experiencing multiple falls. She was found to be anemic so she was infused with PRBC and admitted. When she reached the floor she was noticed to have decreased mentation, was lethargic, and drowsy. Her HGB was checked again and it had dropped from 6.7 to 5.9, with no obvious source of bleeding. Imaging of the hematomas show edema in the bilateral hips suspicious of hematomas, with no mention of continued bleeding. Labs BUN 29 creat 2.3, GFR 21, trop 36 and 38, AST 43. CXR shows borderline cardiomegaly which is chronic for her. She is on 81 mg ASA but no other blood thinners. Vitals 98, HR 72, resp 20, b/p 130/90, 94% on RA. At assisted living she was a DNR/CC, however in the ER she stated she wanted to be a Full Code so that is how they are treating her.     Admitted on behalf of Dr Doyle to 4K 13 with diagnosis of Anemia.        
[facility]   PROGRESS NOTE      Patient: Berenice Fitzgerald  Room #: 4K-13/013-A            YOB: 1950  Age: 73 y.o.  Gender: female            Admit Date & Time: 6/13/2024  2:27 AM    Assessment:  Berenice is being cared for on 4K.  She has her brother and sister-in-law in the room with her.  Berenice is not Sabianism but shared that her cats are meaningful to her and that she hopes to see them again soon.  Her brother shared that Berenice has had several falls in the past few months.    Interventions:  I met with Berenice and offer spiritual support through connection and conversation about what is meaningful to her.      Outcomes:  Berenice expressed gratitude for the open mindedness and the visit.    Plan:    Support patient through presence and active listening.    Electronically signed by Chaplain STACEY, on 6/17/2024 at 2:36 PM.  Spiritual Care Department  [facility]  [phone]    
Writer paged Dr. Baker regarding CT imaging on CD brought in by family. CT needs order to interpret CT. Writer asked if this order can be placed.   
  600(7.4)   Weight (kg) 81.4 81.4 81.4 81.4         General appearance: mild distress due to pain  HEENT: PERRLA, EOMI, NON ICTERIC  Neck: NO LAD, NO THYROMEGALY  Lungs: CLEAR TO AUSCULTATION BILATERALLY  Heart: S1 S2 NORMAL, REGULAR RATE AND RHYTHM, NO AUDIBLE MURMURS  Abdomen: SOFT, NON TENDER, NON DISTENDED, NO ORGANOMEGALY FELT, BOWEL SOUNDS NORMAL  Extremities: NO EDEMA  Neurologic: GROSSLY NON FOCAL  Skin: NO RASHES  Hematology: BRUISING  Genitourinary: no external lesions     LABS:    CBC:   Recent Labs     06/18/24  1752 06/19/24  0346 06/20/24  0347   WBC 8.4 7.9 7.6   HGB 8.3* 8.2* 8.0*    235 266       BMP:  Recent Labs     06/18/24  0403 06/18/24  1242 06/19/24  1210 06/19/24  1645 06/20/24  0347     --  136  --  138   K 3.4*   < > 3.2* 3.8 3.8     --  102  --  105   CO2 19*  --  24  --  21*   BUN 27*  --  28*  --  26*   CREATININE 2.7*  --  2.4*  --  2.4*   GLUCOSE 97  --  138*  --  89   CALCIUM 9.2  --  9.2  --  9.3   MG 1.9  --  1.9  --  1.8    < > = values in this interval not displayed.       TSH: No results for input(s): \"TSH\" in the last 72 hours.  HgBa1c: No results for input(s): \"LABA1C\" in the last 72 hours.  Hepatic:   No results for input(s): \"LABALBU\", \"AST\", \"ALT\", \"BILITOT\", \"ALKPHOS\" in the last 72 hours.    Invalid input(s): \"ALB\"    Troponin: No results for input(s): \"TROPONINI\" in the last 72 hours.  BNP: No results for input(s): \"BNP\" in the last 72 hours.  Lipids: No results for input(s): \"CHOL\", \"HDL\" in the last 72 hours.    Invalid input(s): \"LDLCALCU\"  INR: No results for input(s): \"INR\" in the last 72 hours.      Images and Other:  reviewed      Assessment and Plan:     CHRISTOPHER - ischemic ATN  Chronic kidney disease stage 4 ( baseline serum creatinine 1.8-2.2 mg/dL)  Cardio-renal syndrome   Acute blood loss anemia  Recurrent falls   Extremity hematoma   Recurrent UTI / Recent UTI     Cr is stable     Plan  -Cont supportive care   -BMP , CBC , Mag with am labs 
Comments: Pt is an unreliable historian. Family is not present to confirm/provide information about PLOF.    Restrictions/Precautions:  Restrictions/Precautions: Fall Risk, General Precautions  Position Activity Restriction  Other position/activity restrictions: monitor BP     SUBJECTIVE: RN approved session, reports OT just tried and was unable to keep pt awake. Pt in bed upon arrival with eyes closed, opened eyes easily when stating name, however, required encouragement for therapy. Pt confused and only A&O x1 with increased confusion noted. Pt's brother and sister-in-law present during session.     PAIN: Yes, pain left hip, not rated but c/o of pain when getting out of bed.     Vitals: Blood Pressure: 146/85    OBJECTIVE:  Bed Mobility:  Supine to Sit: Maximum Assistance, X 1, with head of bed flat, with verbal cues , with increased time for completion  Scooting: Moderate Assistance, Maximum Assistance, X 1    Transfers:  Sit to Stand: Minimal Assistance, Moderate Assistance, X 2, cues for hand placement, with verbal cues, increased time for completion.   Stand to Sit:Contact Guard Assistance, Minimal Assistance, X 2    Ambulation:  Minimal Assistance x1 and CGA-Min. A of another   Distance: 2' x1   Surface: Level Tile  Device:Rolling Walker  Gait Deviations:  Forward Flexed Posture, Slow Aida, Decreased Step Length Bilaterally, Decreased Gait Speed, Decreased Heel Strike Bilaterally, Unsteady Gait, and Increased reliance on assistive device    Balance:  Static Sitting Balance:  Stand By Assistance, Contact Guard Assistance  Dynamic Sitting Balance: Contact Guard Assistance  Static Standing Balance: Contact Guard Assistance, Minimal Assistance, X 2  Dynamic Standing Balance: Minimal Assistance x1 and CGA-Min. Of another.     Exercise:  None     Functional Outcome Measures:  [facility] (6 CLICK) BASIC MOBILITY     AM-PAC Inpatient Mobility without Stair Climbing Raw Score : 10  AM-PAC Inpatient without Stair 
TO AUSCULTATION BILATERALLY  Heart: S1 S2 NORMAL, REGULAR RATE AND RHYTHM, NO AUDIBLE MURMURS  Abdomen: SOFT, NON TENDER, NON DISTENDED, NO ORGANOMEGALY FELT, BOWEL SOUNDS NORMAL  Extremities: NO EDEMA  Neurologic: GROSSLY NON FOCAL  Skin: NO RASHES  Hematology: BRUISING  Genitourinary: no external lesions     LABS:    CBC:   Recent Labs     06/13/24 0319 06/13/24  0918 06/14/24  0341 06/14/24  1031 06/14/24  2138 06/15/24  0325 06/15/24  0613   WBC 10.1  --  10.3  --   --   --   --    HGB 7.4*   < > 7.5*   < > 7.0* 6.9* 7.0*     --  185  --   --   --   --     < > = values in this interval not displayed.     BMP:  Recent Labs     06/13/24  0319 06/14/24  0341 06/15/24  0325    137 136   K 4.0 4.0 4.0    100 103   CO2 22* 23 23   BUN 31* 34* 37*   CREATININE 2.9* 3.0* 3.4*   GLUCOSE 133* 102 122*   CALCIUM 8.8 8.8 8.8   MG  --   --  2.2   PHOS  --   --  4.2     TSH: No results for input(s): \"TSH\" in the last 72 hours.  HgBa1c: No results for input(s): \"LABA1C\" in the last 72 hours.  Hepatic:   Recent Labs     06/13/24  0319 06/15/24  0325   AST 29 21   ALT 28 18   BILITOT 0.3 0.4   ALKPHOS 41 44     Troponin: No results for input(s): \"TROPONINI\" in the last 72 hours.  BNP: No results for input(s): \"BNP\" in the last 72 hours.  Lipids: No results for input(s): \"CHOL\", \"HDL\" in the last 72 hours.    Invalid input(s): \"LDLCALCU\"  INR: No results for input(s): \"INR\" in the last 72 hours.    Images and Other:  reviewed      Assessment and Plan:     CHRISTOPHER - ischemic ATN  Chronic kidney disease stage 4 ( baseline serum creatinine 1.8-2.2 mg/dL)  Cardio-renal syndrome   Acute blood loss anemia  Recurrent falls   Extremity hematoma   Recurrent UTI / Recent UTI        Plan  Check CT abdomen and pelvis non contrast   Check serial h/h  Increase IV fluids to NS at 75 ml/hr  Monitor strict I/Os    If active bleeding is suspected  - family and patient were informed of risk vs benefits and consented to proceed with 
workup.  .  History of recurrent UTIs: Per brother history of recurrent UTIs.  Urine culture negative UA negative.  Hypertension: Slowly uptitrating home hypertensives per blood pressure.  Per brother, patient would develop hypertension when she develops UTI or any stressors however her baseline is low around 120/80. Restarted Coreg due to high blood pressure  Insomnia: On trazodone 50 nightly  Hyperlipidemia: On Zetia and Lipitor  Dementia: On memantine.  MoCA score 6 out of 30.  Dysphagia: Initially concerns due to aspiration event on 6/13/2024.  Speech evaluation recommends regular diet and thin liquids with no issues in terms of pharyngeal dysphagia.  Depression: On Zoloft  Suspected GERD: Noted to have some heart burn after meal.  GI cocktail mildly resolved heartburn.  Placed on PPI daily.  HypoK: unclear etiology. Patient is able to tolerate meals. Replacing per protocol. Getting repeat urine studies given CHRISTOPHER. No diarrhea noted. Trending with daily BMP. Obtaining random cortisol level for tomorrow AM  Goals of care: Limited x 4.  Palliative care consulted.  Acute mentation changes (Resolved): Patient noted to have worsening blood pressure in the 200s which improved with Atarax or antianxiety medications.  Increased blood pressure medications including hydralazine to 100 3 times daily.  Started patient on BuSpar for adjuvant therapy on current SSRI regimen.  MRI shows no acute intracranial processes..  High suspicion for medication induced delirium given 1 dose of Atarax due to agitation.  Cannot rule out hospital acquired delirium.  Delirium precautions in place.     Consultations to: Nephrology  ART Line:    BP: BP: (!) 154/85 HR:  Pulse: 72  Air: SpO2: 94 % on O2 Device: None (Room air) on   Baseline 0  Fluids: IVF  Bowel:06/18/24 I/O:   Intake/Output Summary (Last 24 hours) at 6/20/2024 1027  Last data filed at 6/20/2024 0744  Gross per 24 hour   Intake 1373.35 ml   Output 1175 ml   Net 198.35 ml 
BP: (!) 168/75 HR:  Pulse: 70  Air: SpO2: 98 % on O2 Device: None (Room air) on   Baseline 0  Fluids: IVF  Bowel:06/18/24 I/O:   Intake/Output Summary (Last 24 hours) at 6/21/2024 1033  Last data filed at 6/21/2024 0234  Gross per 24 hour   Intake 550 ml   Output 850 ml   Net -300 ml       Nutrition: ADULT DIET; Regular    VTE Prophylaxis:   [] Lovenox  [x] SCDs  [] SQ Heparin  [] Encourage ambulation   [] Already on Anticoagulation    Disposition:    [] Home                   [] ICU       [] Rehab       [] Psych       [x] SNF       [] Long Term Care Facility       [] Other- Date: Pre-CERT pending     PT/OT/SLP: Yes    General Medicine History and Physical   Chief Complaint:  No chief complaint on file.        Initial H&P: Per Dr. Anne  History Of Present Illness:  Berenice Fitzgerald is a 73 y.o. female with PMHx of CHF, CKD, dementia, insomnia, depression, HTN, HLD who presents to [facility] as a transfer from [facility] on 6/13 with anemia.  History obtained via patient as well as her brother who is her POA.  Patient currently resides at [facility] and has been experiencing falls since 5/25.  Patient notes she has some blackening of vision upon standing up.  Most recent fall was on 6/12 with concern for possible hip dislocation per SNF facility.  Patient was initially brought to [facility].  They performed CT A/P that showed subcutaneous edema of bilateral hips with suspicion for subcutaneous hematoma on right.  CXR showed cardiomegaly.  Patient noted to have low hemoglobin at 6.7 with decreased to 5.9.  Patient received PRBC and was transferred to [facility] for further evaluation and care.  On arrival patient reported some shortness of breath that has been stable for the last 1 week.  She denies any lightheadedness, dizziness, chest pain, cough, abdominal pain, constipation, diarrhea.  Patient's brother stated she had a recent UTI and is currently on antibiotics.    6/17:  Overnight patient noted to have high blood 
Guard Assistance, Moderate Assistance, X 1, with cues for safety, with verbal cues   Static Standing Balance: Minimal Assistance, Moderate Assistance, X 1, with cues for safety, with verbal cues   Pt sitting on EOB to prepare for mobility and to complete exercises and complete grooming tasks. Pt with LOB posterior, cues for correction, poor demo. Pt would \"randomly\" start falling asleep and requires cues to maintain alertness. Pt completed standing with RW for tech to complete nicole care following toileting tasks.   Exercise:  Patient was guided in 1 set(s) 5-10 reps of exercise to both lower extremities.  Seated marches and Long arc quads.  Exercises were completed for increased independence with functional mobility. Pt declined to complete further exercises.     Functional Outcome Measures:  [facility] (6 CLICK) BASIC MOBILITY     AM-PAC Inpatient Mobility without Stair Climbing Raw Score : 10  AM-PAC Inpatient without Stair Climbing T-Scale Score : 34.07  Mobility Inpatient CMS 0-100% Score: 71.66  Mobility Inpatient without Stair CMS G-Code Modifier : CL     Modified [place] Scale:  Not Applicable    ASSESSMENT:  Assessment: Patient progressing toward established goals.  Activity Tolerance:  Patient tolerance of  treatment: fair.     Equipment Recommendations:Other: Deferred to next facility  Discharge Recommendations: Subacte/Skilled Nursing Facility  Plan: Current Treatment Recommendations: Strengthening, Balance training, Functional mobility training, Therapeutic activities  General Plan:  (3-5x GM)    Education  Education:  Learners: Patient  Patient Education: Plan of Care, Bed Mobility, Transfers, Gait, Use of Gait Belt, Verbal Exercise Instruction, - Patient Requires Continued Education    Goals:  Patient Goals : None stated this date  Short Term Goals  Time Frame for Short Term Goals: Prior to hospital d/c  Short Term Goal 1: Pt will perform supine <> sitting EOB with CGA and use of bed rails  Short Term Goal 
Measures:   Completed., [facility] (6 CLICK) BASIC MOBILITY     AM-PAC Inpatient Mobility without Stair Climbing Raw Score : 7  AM-PAC Inpatient without Stair Climbing T-Scale Score : 28.66  Mobility Inpatient CMS 0-100% Score: 86.29  Mobility Inpatient without Stair CMS G-Code Modifier : CM  Modified [place] Scale:  Not Applicable    ASSESSMENT:  Activity Tolerance:  Patient tolerance of  treatment: fair. Pt tolerated interventions fairly and interventions performed without incident.       Treatment Initiated: Treatment and education initiated within context of evaluation.  Evaluation time included review of current medical information, gathering information related to past medical, social and functional history, completion of standardized testing, formal and informal observation of tasks, assessment of data and development of plan of care and goals.  Treatment time included skilled education and facilitation of tasks to increase safety and independence with functional mobility for improved independence and quality of life.    Assessment:  Body Structures, Functions, Activity Limitations Requiring Skilled Therapeutic Intervention: Decreased functional mobility , Decreased strength, Decreased safe awareness, Decreased balance, Decreased cognition, Decreased posture, Increased pain  Assessment: Pt is a 74 y/o F s/p fall with concern for L hip dislocation. Imaging pending, hip precautions maintained as precaution. Observation of increased L hip discoloration. See above for details on current condition and past med hx. Prior to admission, pt was living in an assisted living facility. Pt is a poor historian and family is not present resulting in inability to obtain accurate PLOF. Upon PT evaluation, pt is modA for all bed mobility and maxA for static sitting balance. Observation of severe posterior lean requiring maxA from PT to maintain upright positioning, despite maximal verbal, visual and tactile cueing. R lateral lean 
Mobility without Stair Climbing Raw Score : 15  AM-PAC Inpatient without Stair Climbing T-Scale Score : 43.03  Mobility Inpatient CMS 0-100% Score: 47.43  Mobility Inpatient without Stair CMS G-Code Modifier : CK     Modified [place] Scale:  Not Applicable    ASSESSMENT:  Assessment:  pt demonstrated generalized weakness and did fatigue easy w/ standing activity, gait distance and even exercises. Pt would benefit from cont skilled therapy   Activity Tolerance:  Patient tolerance of  treatment: fair.        Equipment Recommendations:Other: Deferred to next facility  Discharge Recommendations: Subacte/Skilled Nursing Facility  Plan: Current Treatment Recommendations: Strengthening, Balance training, Functional mobility training, Therapeutic activities  General Plan:  (3-5x GM)    Education  Education:  Learners: Patient  Patient Education: Plan of Care, safety with transfers     Goals:  Patient Goals : None stated this date  Short Term Goals  Time Frame for Short Term Goals: Prior to hospital d/c  Short Term Goal 1: Pt will perform supine <> sitting EOB with CGA and use of bed rails  Short Term Goal 2: Pt will perform rolling L <> R with CGA and use of bed rails  Short Term Goal 3: Pt will demo ability to sit EOB with CGA and use of BUEs for support while orienting to midline, 10 min.  Short Term Goal 4: Pt will demo sit to/from stand transfers with RW with min A to progress with mobility.  Short Term Goal 5: Pt will amb for 20 feet with RW with CGA to progress with mobility.  Long Term Goals  Time Frame for Long Term Goals : NA due to short ELOS    Following session, patient left in safe position with all fall risk precautions in place.    
Nephrology  ART Line:    BP: BP: (!) 121/99 HR:  Pulse: 62  Air: SpO2: 96 % on O2 Device: None (Room air) on   Baseline 0  Fluids: IVF  Bowel:06/18/24 I/O:   Intake/Output Summary (Last 24 hours) at 6/22/2024 2106  Last data filed at 6/22/2024 1356  Gross per 24 hour   Intake 200 ml   Output 950 ml   Net -750 ml       Nutrition: ADULT DIET; Regular    VTE Prophylaxis:   [] Lovenox  [x] SCDs  [] SQ Heparin  [] Encourage ambulation   [] Already on Anticoagulation    Disposition:    [] Home                   [] ICU       [] Rehab       [] Psych       [x] SNF       [] Long Term Care Facility       [] Other- Date: Pre-CERT pending     PT/OT/SLP: Yes    General Medicine History and Physical   Chief Complaint:  No chief complaint on file.        Initial H&P: Per Dr. Anne  History Of Present Illness:  Berenice Fitzgerald is a 73 y.o. female with PMHx of CHF, CKD, dementia, insomnia, depression, HTN, HLD who presents to [facility] as a transfer from [facility] on 6/13 with anemia.  History obtained via patient as well as her brother who is her POA.  Patient currently resides at [facility] and has been experiencing falls since 5/25.  Patient notes she has some blackening of vision upon standing up.  Most recent fall was on 6/12 with concern for possible hip dislocation per SNF facility.  Patient was initially brought to [facility].  They performed CT A/P that showed subcutaneous edema of bilateral hips with suspicion for subcutaneous hematoma on right.  CXR showed cardiomegaly.  Patient noted to have low hemoglobin at 6.7 with decreased to 5.9.  Patient received PRBC and was transferred to [facility] for further evaluation and care.  On arrival patient reported some shortness of breath that has been stable for the last 1 week.  She denies any lightheadedness, dizziness, chest pain, cough, abdominal pain, constipation, diarrhea.  Patient's brother stated she had a recent UTI and is currently on antibiotics.    6/17:  Overnight 
02:00 PM    BACTERIA NONE SEEN 06/19/2024 02:00 PM    RBCUA 0-2 06/19/2024 02:00 PM    BLOODU NEGATIVE 06/19/2024 02:00 PM    GLUCOSEU NEGATIVE 06/19/2024 02:00 PM    No results for input(s): \"PH\", \"PO2\", \"PCO2\", \"HCO3\", \"BE\", \"O2SAT\" in the last 72 hours. No results for input(s): \"TROPONINI\" in the last 72 hours.       Medications:  Scheduled Medications PRN Medications IV Meds   potassium chloride, 10 mEq, Q1H  [Held by provider] busPIRone, 5 mg, TID  hydrALAZINE, 100 mg, 3 times per day  NIFEdipine, 30 mg, Daily  memantine, 5 mg, BID  sodium chloride flush, 5-40 mL, 2 times per day  [Held by provider] aspirin, 81 mg, Daily  ezetimibe, 10 mg, Nightly  [Held by provider] traZODone, 50 mg, Nightly  sertraline, 100 mg, Daily  atorvastatin, 40 mg, Nightly  carvedilol, 12.5 mg, BID WC  isosorbide mononitrate, 30 mg, Daily  [Held by provider] bumetanide, 2 mg, Every Other Day     hydrALAZINE, 10 mg, Q6H PRN  hydrOXYzine HCl, 10 mg, Q6H PRN  cloNIDine, 0.1 mg, TID PRN  sodium chloride, , PRN  sodium chloride, , PRN  albuterol sulfate HFA, 2 puff, Q4H PRN  sodium chloride flush, 5-40 mL, PRN  sodium chloride, , PRN  ondansetron, 4 mg, Q8H PRN   Or  ondansetron, 4 mg, Q6H PRN  polyethylene glycol, 17 g, Daily PRN  acetaminophen, 650 mg, Q6H PRN   Or  acetaminophen, 650 mg, Q6H PRN  glucose, 4 tablet, PRN  dextrose bolus, 125 mL, PRN   Or  dextrose bolus, 250 mL, PRN  glucagon (rDNA), 1 mg, PRN  dextrose, , Continuous PRN     sodium chloride  sodium chloride  sodium chloride  dextrose             Past Medical History Past Surgical History    has no past medical history on file.    Patient Active Problem List   Diagnosis Code    Normocytic anemia due to blood loss D50.0    Hematoma T14.8XXA    Chronic congestive heart failure (HCC) I50.9    Stage 3b chronic kidney disease (HCC) N18.32       has no past surgical history on file.     Social History Family History    reports that she has never smoked. She does not have any 
    Normal sized kidneys without hydronephrosis. Right kidney is echogenic suggestive of medical renal disease. 7 mm echogenicity with posterior acoustic shadowing in the right kidney suspicious for a nonobstructive renal stone. This document has been electronically signed by: Trevin Dickinson MD on 06/13/2024 07:23 AM    MRI BRAIN WO CONTRAST   Final Result      1. No acute findings.   2. Moderate severity chronic small vessel ischemic changes.               **This report has been created using voice recognition software. It may contain   minor errors which are inherent in voice recognition technology.**         Electronically signed by Dr. Isi Monsivais      CT ABDOMEN PELVIS WO CONTRAST Additional Contrast? Radiologist Recommendation   Final Result   1. No acute intra-abdominal or intrapelvic findings.   2. Bilateral nephrolithiasis.   3. Bilateral hip hematomas.       Electronically signed by Dr. Rudolph Glover      CT HIP LEFT WO CONTRAST   Final Result   1. No fracture or dislocation.   2. Slight reduction in size of left thigh hematoma.      Electronically signed by Dr. Rudolph Glover      CT HIP LEFT WO CONTRAST   Final Result   1. A 15.6 x 9.7 x 7.5 cm hematoma is seen along the left thigh region. It is   difficult to compare with the prior exam given that it was not entirely included   on examination.   2. Associated large area of marked subcutaneous edema.            **This report has been created using voice recognition software.  It may contain   minor errors which are inherent in voice recognition technology.**      Electronically signed by Dr. Alanna Dumont      XR CHEST PORTABLE   Final Result      No acute intrathoracic process.               **This report has been created using voice recognition software. It may contain   minor errors which are inherent in voice recognition technology.**            Electronically signed by Dr. Rudolph Glover      US EXTREMITY LEFT NON VASC LIMITED   Final 
  06/21/2024 Growth of Contaminants (A)  Final     Aerobic No components found for: \"LABEARO\"  Anaerobic No results found for: \"LABANAE\"  Urine   Urine Culture, Routine   Date Value Ref Range Status   06/21/2024 (A)  Final    No significant pathogens isolated. Growth likely represents skin jessenia or distal urethral jessenia.     Strep pneumo No results found for: \"SPNEUAGU\"  HSV No results found for: \"HSVSRC\"  FLU No results found for: \"FLUA\" No results found for: \"FLUB\"  Acid fast No results found for: \"AFBSMEAR\"  Stool No results found for: \"CXST\"  Fungust No results found for: \"FUNGUSSMEAR\"  Legionella No results found for: \"LABLEGI\"  VRE screen No results found for: \"VRECX\"  MRSA scree No components found for: \"MRSACU\"  C Diff PCR No results found for: \"CDIFPCR\"  Ecoli O051:H7 No results found for: \"LKAPR908\"  Giardia No results found for: \"GIAAGS\"  Rotavirus No results found for: \"ROTA\"  Fecal leukocytes No results found for: \"FECLEU\"    Signed:  Ricki Stubbs MD       06/23/24  3:30 PM

## 2024-06-24 NOTE — PLAN OF CARE
Name: Berenice Fitzgerald  YOB: 1950  MRN: [id number]  Date: 06/13/24     Plan of Care        Patient has been seen and evaluated. Hematoma picture on chart. Looking for extravasation. US ordered. CT AP of the hip pending interpretation. Will start ice pack and trial of orthostats.     Electronically signed by Fabio Washington DO on 6/13/2024 at 1:46 PM      
  Problem: Discharge Planning  Goal: Discharge to home or other facility with appropriate resources  6/13/2024 1353 by Brandee Herron LSW  Outcome: Progressing     Consult received. Please see SW note dated 6/13.  
  Problem: Discharge Planning  Goal: Discharge to home or other facility with appropriate resources  6/20/2024 1213 by Roderick Mitchell, CHRISTIAN  Outcome: Progressing  See SW note.      
  Problem: Discharge Planning  Goal: Discharge to home or other facility with appropriate resources  6/20/2024 1631 by Alisha Leon RN  Outcome: Progressing  Flowsheets (Taken 6/20/2024 1631)  Discharge to home or other facility with appropriate resources:   Identify barriers to discharge with patient and caregiver   Arrange for needed discharge resources and transportation as appropriate   Identify discharge learning needs (meds, wound care, etc)     Problem: Safety - Adult  Goal: Free from fall injury  6/20/2024 1631 by Alisha Leon RN  Outcome: Progressing  Flowsheets (Taken 6/20/2024 1631)  Free From Fall Injury: Instruct family/caregiver on patient safety     Problem: Chronic Conditions and Co-morbidities  Goal: Patient's chronic conditions and co-morbidity symptoms are monitored and maintained or improved  6/20/2024 1631 by Alisha Leon RN  Outcome: Progressing  Flowsheets (Taken 6/20/2024 1631)  Care Plan - Patient's Chronic Conditions and Co-Morbidity Symptoms are Monitored and Maintained or Improved:   Monitor and assess patient's chronic conditions and comorbid symptoms for stability, deterioration, or improvement   Collaborate with multidisciplinary team to address chronic and comorbid conditions and prevent exacerbation or deterioration   Update acute care plan with appropriate goals if chronic or comorbid symptoms are exacerbated and prevent overall improvement and discharge     Problem: Pain  Goal: Verbalizes/displays adequate comfort level or baseline comfort level  6/20/2024 1631 by Alisha Leon RN  Outcome: Progressing  Flowsheets (Taken 6/20/2024 1631)  Verbalizes/displays adequate comfort level or baseline comfort level:   Encourage patient to monitor pain and request assistance   Assess pain using appropriate pain scale   Administer analgesics based on type and severity of pain and evaluate response   Implement non-pharmacological measures as appropriate and evaluate 
  Problem: Discharge Planning  Goal: Discharge to home or other facility with appropriate resources  6/21/2024 0245 by Abeba Christiansen RN  Outcome: Progressing  Flowsheets (Taken 6/20/2024 2056)  Discharge to home or other facility with appropriate resources:   Identify barriers to discharge with patient and caregiver   Arrange for needed discharge resources and transportation as appropriate   Identify discharge learning needs (meds, wound care, etc)     Problem: Safety - Adult  Goal: Free from fall injury  6/21/2024 0245 by Abeba Christiansen RN  Outcome: Progressing     Problem: Chronic Conditions and Co-morbidities  Goal: Patient's chronic conditions and co-morbidity symptoms are monitored and maintained or improved  6/21/2024 0245 by Abeba Christiansen RN  Outcome: Progressing  Flowsheets (Taken 6/20/2024 2056)  Care Plan - Patient's Chronic Conditions and Co-Morbidity Symptoms are Monitored and Maintained or Improved:   Monitor and assess patient's chronic conditions and comorbid symptoms for stability, deterioration, or improvement   Collaborate with multidisciplinary team to address chronic and comorbid conditions and prevent exacerbation or deterioration   Update acute care plan with appropriate goals if chronic or comorbid symptoms are exacerbated and prevent overall improvement and discharge     Problem: Pain  Goal: Verbalizes/displays adequate comfort level or baseline comfort level  6/21/2024 0245 by Abeba Christiansen RN  Outcome: Progressing     Problem: Neurosensory - Adult  Goal: Achieves stable or improved neurological status  6/21/2024 0245 by Abeba Christiansen RN  Outcome: Progressing     Problem: Cardiovascular - Adult  Goal: Maintains optimal cardiac output and hemodynamic stability  6/21/2024 0245 by Abeba Christiansen RN  Outcome: Progressing  Flowsheets (Taken 6/20/2024 2056)  Maintains optimal cardiac output and hemodynamic stability:   Monitor blood pressure and 
  Problem: Discharge Planning  Goal: Discharge to home or other facility with appropriate resources  6/21/2024 0939 by Valarie Vann RN  Outcome: Progressing  Flowsheets (Taken 6/21/2024 0815)  Discharge to home or other facility with appropriate resources:   Identify barriers to discharge with patient and caregiver   Arrange for needed discharge resources and transportation as appropriate   Identify discharge learning needs (meds, wound care, etc)   Refer to discharge planning if patient needs post-hospital services based on physician order or complex needs related to functional status, cognitive ability or social support system  6/21/2024 0245 by Abeba Christiansen, RN  Outcome: Progressing  Flowsheets (Taken 6/20/2024 2056)  Discharge to home or other facility with appropriate resources:   Identify barriers to discharge with patient and caregiver   Arrange for needed discharge resources and transportation as appropriate   Identify discharge learning needs (meds, wound care, etc)     Problem: Safety - Adult  Goal: Free from fall injury  6/21/2024 0939 by Valarie Vann RN  Outcome: Progressing  6/21/2024 0245 by Abeba Christiansen RN  Outcome: Progressing     Problem: Chronic Conditions and Co-morbidities  Goal: Patient's chronic conditions and co-morbidity symptoms are monitored and maintained or improved  6/21/2024 0939 by Valarie Vann RN  Outcome: Progressing  Flowsheets (Taken 6/21/2024 0815)  Care Plan - Patient's Chronic Conditions and Co-Morbidity Symptoms are Monitored and Maintained or Improved:   Monitor and assess patient's chronic conditions and comorbid symptoms for stability, deterioration, or improvement   Collaborate with multidisciplinary team to address chronic and comorbid conditions and prevent exacerbation or deterioration  6/21/2024 0245 by Abeba Christiansen, RN  Outcome: Progressing  Flowsheets (Taken 6/20/2024 2056)  Care Plan - Patient's Chronic Conditions and Co-Morbidity 
  Problem: Discharge Planning  Goal: Discharge to home or other facility with appropriate resources  Outcome: Progressing     Problem: Safety - Adult  Goal: Free from fall injury  Outcome: Progressing     Problem: Chronic Conditions and Co-morbidities  Goal: Patient's chronic conditions and co-morbidity symptoms are monitored and maintained or improved  Outcome: Progressing     Problem: Pain  Goal: Verbalizes/displays adequate comfort level or baseline comfort level  Outcome: Progressing     
  Problem: Discharge Planning  Goal: Discharge to home or other facility with appropriate resources  Outcome: Progressing  Flowsheets  Taken 6/22/2024 1356 by Nancy Ch RN  Discharge to home or other facility with appropriate resources: Identify barriers to discharge with patient and caregiver  Taken 6/22/2024 0850 by Abi Harrell RN  Discharge to home or other facility with appropriate resources:   Identify barriers to discharge with patient and caregiver   Arrange for needed discharge resources and transportation as appropriate   Identify discharge learning needs (meds, wound care, etc)   Refer to discharge planning if patient needs post-hospital services based on physician order or complex needs related to functional status, cognitive ability or social support system     Problem: Safety - Adult  Goal: Free from fall injury  Outcome: Progressing     Problem: Chronic Conditions and Co-morbidities  Goal: Patient's chronic conditions and co-morbidity symptoms are monitored and maintained or improved  Outcome: Progressing  Flowsheets  Taken 6/22/2024 1356 by Nancy Ch RN  Care Plan - Patient's Chronic Conditions and Co-Morbidity Symptoms are Monitored and Maintained or Improved: Monitor and assess patient's chronic conditions and comorbid symptoms for stability, deterioration, or improvement  Taken 6/22/2024 0850 by Abi Harrell RN  Care Plan - Patient's Chronic Conditions and Co-Morbidity Symptoms are Monitored and Maintained or Improved: Monitor and assess patient's chronic conditions and comorbid symptoms for stability, deterioration, or improvement     Problem: Pain  Goal: Verbalizes/displays adequate comfort level or baseline comfort level  Outcome: Progressing  Flowsheets (Taken 6/22/2024 0847 by Abi Harrell RN)  Verbalizes/displays adequate comfort level or baseline comfort level:   Assess pain using appropriate pain scale   Encourage patient to monitor pain and 
  Problem: Discharge Planning  Goal: Discharge to home or other facility with appropriate resources  Outcome: Progressing  Flowsheets (Taken 6/18/2024 2030)  Discharge to home or other facility with appropriate resources:   Identify barriers to discharge with patient and caregiver   Arrange for needed discharge resources and transportation as appropriate   Identify discharge learning needs (meds, wound care, etc)     Problem: Safety - Adult  Goal: Free from fall injury  Outcome: Progressing  Flowsheets (Taken 6/19/2024 0511)  Free From Fall Injury:   Instruct family/caregiver on patient safety   Based on caregiver fall risk screen, instruct family/caregiver to ask for assistance with transferring infant if caregiver noted to have fall risk factors     Problem: Chronic Conditions and Co-morbidities  Goal: Patient's chronic conditions and co-morbidity symptoms are monitored and maintained or improved  Outcome: Progressing  Flowsheets (Taken 6/18/2024 2030)  Care Plan - Patient's Chronic Conditions and Co-Morbidity Symptoms are Monitored and Maintained or Improved:   Monitor and assess patient's chronic conditions and comorbid symptoms for stability, deterioration, or improvement   Collaborate with multidisciplinary team to address chronic and comorbid conditions and prevent exacerbation or deterioration     Problem: Neurosensory - Adult  Goal: Achieves stable or improved neurological status  Outcome: Progressing  Flowsheets (Taken 6/18/2024 2030)  Achieves stable or improved neurological status:   Assess for and report changes in neurological status   Initiate measures to prevent increased intracranial pressure   Maintain blood pressure and fluid volume within ordered parameters to optimize cerebral perfusion and minimize risk of hemorrhage     Problem: Cardiovascular - Adult  Goal: Maintains optimal cardiac output and hemodynamic stability  Outcome: Progressing  Flowsheets (Taken 6/18/2024 2030)  Maintains optimal 
  Problem: Pain  Goal: Verbalizes/displays adequate comfort level or baseline comfort level  6/13/2024 1246 by Kelly Arnold RN  Outcome: Not Progressing  6/13/2024 0233 by James Deshpande RN  Outcome: Progressing     Problem: Neurosensory - Adult  Goal: Achieves stable or improved neurological status  Outcome: Not Progressing     Problem: Musculoskeletal - Adult  Goal: Return mobility to safest level of function  Outcome: Not Progressing     Problem: Gastrointestinal - Adult  Goal: Maintains adequate nutritional intake  Outcome: Not Progressing     Problem: Pain  Goal: Verbalizes/displays adequate comfort level or baseline comfort level  6/13/2024 1246 by Kelly Arnold RN  Outcome: Not Progressing  6/13/2024 0233 by James Deshpande RN  Outcome: Progressing     Problem: Neurosensory - Adult  Goal: Achieves stable or improved neurological status  Outcome: Not Progressing     Problem: Musculoskeletal - Adult  Goal: Return mobility to safest level of function  Outcome: Not Progressing     Problem: Gastrointestinal - Adult  Goal: Maintains adequate nutritional intake  Outcome: Not Progressing     
Progressing  Flowsheets (Taken 6/19/2024 1610)  Achieves stable or improved neurological status: Assess for and report changes in neurological status     Problem: Cardiovascular - Adult  Goal: Maintains optimal cardiac output and hemodynamic stability  6/19/2024 1610 by Alisha Leon RN  Outcome: Progressing  Flowsheets (Taken 6/19/2024 1610)  Maintains optimal cardiac output and hemodynamic stability:   Monitor blood pressure and heart rate   Monitor urine output and notify Licensed Independent Practitioner for values outside of normal range   Assess for signs of decreased cardiac output     Problem: Skin/Tissue Integrity - Adult  Goal: Skin integrity remains intact  6/19/2024 1610 by Alisha Leon RN  Outcome: Progressing  Flowsheets (Taken 6/19/2024 1610)  Skin Integrity Remains Intact:   Monitor for areas of redness and/or skin breakdown   Assess vascular access sites hourly     Problem: Musculoskeletal - Adult  Goal: Return mobility to safest level of function  6/19/2024 1610 by Alisha Leon RN  Outcome: Progressing  Flowsheets (Taken 6/19/2024 1610)  Return Mobility to Safest Level of Function:   Assess patient stability and activity tolerance for standing, transferring and ambulating with or without assistive devices   Assist with transfers and ambulation using safe patient handling equipment as needed   Ensure adequate protection for wounds/incisions during mobilization   Obtain physical therapy/occupational therapy consults as needed   Instruct patient/family in ordered activity level     Problem: Gastrointestinal - Adult  Goal: Maintains adequate nutritional intake  6/19/2024 1610 by Alisha Leon RN  Outcome: Progressing  Flowsheets (Taken 6/19/2024 1610)  Maintains adequate nutritional intake:   Monitor percentage of each meal consumed   Identify factors contributing to decreased intake, treat as appropriate   Monitor intake and output, weight and lab values     Problem: Skin/Tissue 
consults as needed  Outcome: Progressing     Problem: Decision Making  Goal: Pt/Family able to effectively weigh alternatives and participate in decision making related to treatment and care  Description: INTERVENTIONS:  1. Determine when there are differences between patient's view, family's view, and healthcare provider's view of condition  2. Facilitate patient and family articulation of goals for care  3. Help patient and family identify pros/cons of alternative solutions  4. Provide information as requested by patient/family  5. Respect patient/family right to receive or not to receive information  6. Serve as a liaison between patient and family and health care team  7. Initiate Consults from Ethics, Palliative Care or initiate Family Care Conference as is appropriate  Outcome: Progressing     Problem: Confusion  Goal: Confusion, delirium, dementia, or psychosis is improved or at baseline  Description: INTERVENTIONS:  1. Assess for possible contributors to thought disturbance, including medications, impaired vision or hearing, underlying metabolic abnormalities, dehydration, psychiatric diagnoses, and notify attending LIP  2. [place] high risk fall precautions, as indicated  3. Provide frequent short contacts to provide reality reorientation, refocusing and direction  4. Decrease environmental stimuli, including noise as appropriate  5. Monitor and intervene to maintain adequate nutrition, hydration, elimination, sleep and activity  6. If unable to ensure safety without constant attention obtain sitter and review sitter guidelines with assigned personnel  7. Initiate Psychosocial CNS and Spiritual Care consult, as indicated  Outcome: Progressing     Problem: ABCDS Injury Assessment  Goal: Absence of physical injury  Outcome: Progressing     
view, family's view, and healthcare provider's view of condition  2. Facilitate patient and family articulation of goals for care  3. Help patient and family identify pros/cons of alternative solutions  4. Provide information as requested by patient/family  5. Respect patient/family right to receive or not to receive information  6. Serve as a liaison between patient and family and health care team  7. Initiate Consults from Ethics, Palliative Care or initiate Family Care Conference as is appropriate  Outcome: Progressing  Flowsheets (Taken 6/17/2024 2000)  Patient/family able to effectively weigh alternatives and participate in decision making related to treatment and care: Determine when there are differences between patient's view, family's view, and healthcare provider's view of condition     Problem: Confusion  Goal: Confusion, delirium, dementia, or psychosis is improved or at baseline  Description: INTERVENTIONS:  1. Assess for possible contributors to thought disturbance, including medications, impaired vision or hearing, underlying metabolic abnormalities, dehydration, psychiatric diagnoses, and notify attending LIP  2. [place] high risk fall precautions, as indicated  3. Provide frequent short contacts to provide reality reorientation, refocusing and direction  4. Decrease environmental stimuli, including noise as appropriate  5. Monitor and intervene to maintain adequate nutrition, hydration, elimination, sleep and activity  6. If unable to ensure safety without constant attention obtain sitter and review sitter guidelines with assigned personnel  7. Initiate Psychosocial CNS and Spiritual Care consult, as indicated  Outcome: Progressing  Flowsheets (Taken 6/17/2024 2000)  Effect of thought disturbance (confusion, delirium, dementia, or psychosis) are managed with adequate functional status: Assess for contributors to thought disturbance, including medications, impaired vision or hearing, underlying 
Care or initiate Family Care Conference as is appropriate     Problem: Confusion  Goal: Confusion, delirium, dementia, or psychosis is improved or at baseline  Description: INTERVENTIONS:  1. Assess for possible contributors to thought disturbance, including medications, impaired vision or hearing, underlying metabolic abnormalities, dehydration, psychiatric diagnoses, and notify attending LIP  2. [place] high risk fall precautions, as indicated  3. Provide frequent short contacts to provide reality reorientation, refocusing and direction  4. Decrease environmental stimuli, including noise as appropriate  5. Monitor and intervene to maintain adequate nutrition, hydration, elimination, sleep and activity  6. If unable to ensure safety without constant attention obtain sitter and review sitter guidelines with assigned personnel  7. Initiate Psychosocial CNS and Spiritual Care consult, as indicated  Flowsheets (Taken 6/15/2024 0006)  Effect of thought disturbance (confusion, delirium, dementia, or psychosis) are managed with adequate functional status:   Assess for contributors to thought disturbance, including medications, impaired vision or hearing, underlying metabolic abnormalities, dehydration, psychiatric diagnoses, notify LIP   [place] high risk fall precautions, as indicated   Provide frequent short contacts to provide reality reorientation, refocusing and direction   Decrease environmental stimuli, including noise as appropriate   Monitor and intervene to maintain adequate nutrition, hydration, elimination, sleep and activity   If unable to ensure safety without constant attention obtain sitter and review sitter guidelines with assigned personnel   Initiate Psychosocial Clinical Nurse Specialist and Spiritual Care consult, as indicated     Care plan reviewed with patient.  Patient unable to verbalize understanding of the plan of care and contributes to goal setting.         
disturbance, including medications, impaired vision or hearing, underlying metabolic abnormalities, dehydration, psychiatric diagnoses, notify LIP   [place] high risk fall precautions, as indicated   Provide frequent short contacts to provide reality reorientation, refocusing and direction     
right to receive or not to receive information  6. Serve as a liaison between patient and family and health care team  7. Initiate Consults from Ethics, Palliative Care or initiate Family Care Conference as is appropriate  Outcome: Progressing  Flowsheets (Taken 6/19/2024 2000)  Patient/family able to effectively weigh alternatives and participate in decision making related to treatment and care:   Determine when there are differences between patient's view, family's view, and healthcare provider's view of condition   Facilitate patient and family articulation of goals for care   Help patient and family identify pros/cons of alternative solutions     Problem: Confusion  Goal: Confusion, delirium, dementia, or psychosis is improved or at baseline  Description: INTERVENTIONS:  1. Assess for possible contributors to thought disturbance, including medications, impaired vision or hearing, underlying metabolic abnormalities, dehydration, psychiatric diagnoses, and notify attending LIP  2. [place] high risk fall precautions, as indicated  3. Provide frequent short contacts to provide reality reorientation, refocusing and direction  4. Decrease environmental stimuli, including noise as appropriate  5. Monitor and intervene to maintain adequate nutrition, hydration, elimination, sleep and activity  6. If unable to ensure safety without constant attention obtain sitter and review sitter guidelines with assigned personnel  7. Initiate Psychosocial CNS and Spiritual Care consult, as indicated  6/20/2024 0451 by Nancy Bhatia RN  Outcome: Progressing  Flowsheets (Taken 6/19/2024 2000)  Effect of thought disturbance (confusion, delirium, dementia, or psychosis) are managed with adequate functional status:   Assess for contributors to thought disturbance, including medications, impaired vision or hearing, underlying metabolic abnormalities, dehydration, psychiatric diagnoses, notify LIP   [place] high risk fall 
sitter and review sitter guidelines with assigned personnel   Initiate Psychosocial Clinical Nurse Specialist and Spiritual Care consult, as indicated     Care plan reviewed with patient.  Patient verbalizes understanding of the plan of care and contributes to goal setting.         
delirium, dementia, or psychosis) are managed with adequate functional status:   Assess for contributors to thought disturbance, including medications, impaired vision or hearing, underlying metabolic abnormalities, dehydration, psychiatric diagnoses, notify LIP   [place] high risk fall precautions, as indicated   Provide frequent short contacts to provide reality reorientation, refocusing and direction   Decrease environmental stimuli, including noise as appropriate   Monitor and intervene to maintain adequate nutrition, hydration, elimination, sleep and activity   If unable to ensure safety without constant attention obtain sitter and review sitter guidelines with assigned personnel   Initiate Psychosocial Clinical Nurse Specialist and Spiritual Care consult, as indicated     Problem: Pain  Goal: Verbalizes/displays adequate comfort level or baseline comfort level  6/14/2024 0032 by Linda Cordero RN  Outcome: Progressing  Flowsheets (Taken 6/14/2024 0032)  Verbalizes/displays adequate comfort level or baseline comfort level:   Encourage patient to monitor pain and request assistance   Administer analgesics based on type and severity of pain and evaluate response   Consider cultural and social influences on pain and pain management   Assess pain using appropriate pain scale   Implement non-pharmacological measures as appropriate and evaluate response   Notify Licensed Independent Practitioner if interventions unsuccessful or patient reports new pain  Note: Utilize correct pain assessment tool based on patient abilities.  Use appropriate pain medications base on appropriate tools.  Utilize non-pharmacologic pain reduction methods to supplement ordered pain medications.Educate patient on pain control.  Educate patient on acceptable pain level with chronic pain.   Talk to patient about non-pharmaceutical pain interventions.Encourage use of medication when needed.  Promote rest and distractions from

## 2024-06-24 NOTE — DISCHARGE SUMMARY
Discharge Summary     Patient Identification:  Berenice Fitzgerald  : 1950  MRN: [id number]   Account: [id number]     Admit date: 2024  Discharge date: 2024  Attending provider: No att. providers found        Primary care provider: Manav Wilson DO     Discharge Diagnoses:   Hypertension/HTN emergency: Slowly uptitrating home hypertensives per blood pressure.  Per brother, patient would develop hypertension when she develops UTI or any stressors however her baseline is low around 120/80. Restarted Coreg due to high blood pressure.   Current regiment Coreg 12.5 twice daily, clonidine 0.1 3 times daily, hydralazine 100 mg 3 times daily, Imdur 30 mg daily, nifedipine 60 mg twice daily  BMP in 1 week with follow-up with nephrology.  Nephrology to follow Metanephrine studies  Acute blood loss anemia (Resolved): Patient has multiple falls since .  Her last fall on  resulted in hip dislocation.  Hematomas noted over the left hip.  Hemoglobin at outside facility was 5.9-6.7 post 1 unit PRBC.  CT abdomen pelvis at outside facility shows subcutaneous hematoma on the right.  Second unit transfused on 2024. Transfuse 1u (total 2u pRBC during admission).   Reviewed CT of LE on 6/15/2024, appears to have some regression of hematoma.  Repeat CT of LE shows some mild increase in hematoma .  Hemoglobin-stable at this time. New margins added on patient .  Bruising greatly improving.  Recurrent falls: Noted on chart review.  Denies any syncopal episodes.  Patient denies any memory of fall.  Brother who is POA has noted multiple falls of patient.  Patient uses walker at baseline.  See image for hematoma.  CHRISTOPHER on CKD 4:   Nephrologist .   Cr stable, new baseline 2.4-3?  No significant electroltye derangements.  CHF: Per brother, history of heart failure.  Done on [facility].  Was scheduled for a cardiac cath for evaluation however delayed due to worsening kidney function.  Repeat

## 2024-06-26 LAB — RENIN PLAS-CCNC: 27 NG/ML/HR

## 2024-06-27 LAB
METANEPHRINES PLASMA: NORMAL
METANEPHRINES TOTAL URINE: NORMAL